# Patient Record
Sex: FEMALE | Race: BLACK OR AFRICAN AMERICAN | Employment: FULL TIME | URBAN - METROPOLITAN AREA
[De-identification: names, ages, dates, MRNs, and addresses within clinical notes are randomized per-mention and may not be internally consistent; named-entity substitution may affect disease eponyms.]

---

## 2021-09-09 ENCOUNTER — APPOINTMENT (OUTPATIENT)
Dept: GENERAL RADIOLOGY | Facility: HOSPITAL | Age: 41
End: 2021-09-09
Attending: EMERGENCY MEDICINE
Payer: COMMERCIAL

## 2021-09-09 ENCOUNTER — HOSPITAL ENCOUNTER (EMERGENCY)
Facility: HOSPITAL | Age: 41
Discharge: HOME | End: 2021-09-09
Attending: EMERGENCY MEDICINE
Payer: COMMERCIAL

## 2021-09-09 VITALS
RESPIRATION RATE: 22 BRPM | SYSTOLIC BLOOD PRESSURE: 149 MMHG | WEIGHT: 275 LBS | BODY MASS INDEX: 40.73 KG/M2 | TEMPERATURE: 99 F | HEART RATE: 103 BPM | OXYGEN SATURATION: 96 % | HEIGHT: 69 IN | DIASTOLIC BLOOD PRESSURE: 88 MMHG

## 2021-09-09 DIAGNOSIS — U07.1 PNEUMONIA DUE TO COVID-19 VIRUS: Primary | ICD-10-CM

## 2021-09-09 DIAGNOSIS — J12.82 PNEUMONIA DUE TO COVID-19 VIRUS: Primary | ICD-10-CM

## 2021-09-09 LAB
ALBUMIN SERPL-MCNC: 3.8 G/DL (ref 3.4–5)
ALBUMIN/GLOB SERPL: 1 {RATIO} (ref 1–2)
ALP LIVER SERPL-CCNC: 87 U/L
ALT SERPL-CCNC: 47 U/L
ANION GAP SERPL CALC-SCNC: 5 MMOL/L (ref 0–18)
AST SERPL-CCNC: 46 U/L (ref 15–37)
ATRIAL RATE: 94 BPM
B-HCG UR QL: NEGATIVE
BASOPHILS # BLD AUTO: 0.01 X10(3) UL (ref 0–0.2)
BASOPHILS NFR BLD AUTO: 0.2 %
BILIRUB SERPL-MCNC: 0.4 MG/DL (ref 0.1–2)
BILIRUB UR QL STRIP.AUTO: NEGATIVE
BUN BLD-MCNC: 11 MG/DL (ref 7–18)
CALCIUM BLD-MCNC: 8.6 MG/DL (ref 8.5–10.1)
CHLORIDE SERPL-SCNC: 104 MMOL/L (ref 98–112)
CK SERPL-CCNC: 106 U/L
CLARITY UR REFRACT.AUTO: CLEAR
CO2 SERPL-SCNC: 27 MMOL/L (ref 21–32)
COLOR UR AUTO: YELLOW
CREAT BLD-MCNC: 0.97 MG/DL
CRP SERPL-MCNC: 5.78 MG/DL (ref ?–0.3)
D-DIMER: <0.27 UG/ML FEU (ref ?–0.5)
DEPRECATED HBV CORE AB SER IA-ACNC: 594.9 NG/ML
EOSINOPHIL # BLD AUTO: 0 X10(3) UL (ref 0–0.7)
EOSINOPHIL NFR BLD AUTO: 0 %
ERYTHROCYTE [DISTWIDTH] IN BLOOD BY AUTOMATED COUNT: 12.5 %
GLOBULIN PLAS-MCNC: 3.9 G/DL (ref 2.8–4.4)
GLUCOSE BLD-MCNC: 113 MG/DL (ref 70–99)
GLUCOSE UR STRIP.AUTO-MCNC: NEGATIVE MG/DL
HCT VFR BLD AUTO: 49.2 %
HGB BLD-MCNC: 16.5 G/DL
IMM GRANULOCYTES # BLD AUTO: 0.02 X10(3) UL (ref 0–1)
IMM GRANULOCYTES NFR BLD: 0.4 %
LDH SERPL L TO P-CCNC: 365 U/L
LYMPHOCYTES # BLD AUTO: 1.25 X10(3) UL (ref 1–4)
LYMPHOCYTES NFR BLD AUTO: 23.4 %
M PROTEIN MFR SERPL ELPH: 7.7 G/DL (ref 6.4–8.2)
MCH RBC QN AUTO: 30.6 PG (ref 26–34)
MCHC RBC AUTO-ENTMCNC: 33.5 G/DL (ref 31–37)
MCV RBC AUTO: 91.1 FL
MONOCYTES # BLD AUTO: 0.29 X10(3) UL (ref 0.1–1)
MONOCYTES NFR BLD AUTO: 5.4 %
NEUTROPHILS # BLD AUTO: 3.78 X10 (3) UL (ref 1.5–7.7)
NEUTROPHILS # BLD AUTO: 3.78 X10(3) UL (ref 1.5–7.7)
NEUTROPHILS NFR BLD AUTO: 70.6 %
NITRITE UR QL STRIP.AUTO: NEGATIVE
NT-PROBNP SERPL-MCNC: 6 PG/ML (ref ?–125)
OSMOLALITY SERPL CALC.SUM OF ELEC: 282 MOSM/KG (ref 275–295)
P AXIS: 20 DEGREES
P-R INTERVAL: 160 MS
PH UR STRIP.AUTO: 5 [PH] (ref 5–8)
PLATELET # BLD AUTO: 275 10(3)UL (ref 150–450)
POTASSIUM SERPL-SCNC: 3.8 MMOL/L (ref 3.5–5.1)
PROCALCITONIN SERPL-MCNC: <0.05 NG/ML (ref ?–0.16)
PROT UR STRIP.AUTO-MCNC: NEGATIVE MG/DL
Q-T INTERVAL: 354 MS
QRS DURATION: 92 MS
QTC CALCULATION (BEZET): 442 MS
R AXIS: -20 DEGREES
RBC # BLD AUTO: 5.4 X10(6)UL
RBC UR QL AUTO: NEGATIVE
SODIUM SERPL-SCNC: 136 MMOL/L (ref 136–145)
SP GR UR STRIP.AUTO: 1.02 (ref 1–1.03)
T AXIS: 77 DEGREES
TROPONIN I SERPL-MCNC: <0.045 NG/ML (ref ?–0.04)
UROBILINOGEN UR STRIP.AUTO-MCNC: <2 MG/DL
VENTRICULAR RATE: 94 BPM
WBC # BLD AUTO: 5.4 X10(3) UL (ref 4–11)

## 2021-09-09 PROCEDURE — 71045 X-RAY EXAM CHEST 1 VIEW: CPT | Performed by: EMERGENCY MEDICINE

## 2021-09-09 PROCEDURE — 85025 COMPLETE CBC W/AUTO DIFF WBC: CPT | Performed by: EMERGENCY MEDICINE

## 2021-09-09 PROCEDURE — 81025 URINE PREGNANCY TEST: CPT

## 2021-09-09 PROCEDURE — 85379 FIBRIN DEGRADATION QUANT: CPT | Performed by: EMERGENCY MEDICINE

## 2021-09-09 PROCEDURE — 83615 LACTATE (LD) (LDH) ENZYME: CPT | Performed by: EMERGENCY MEDICINE

## 2021-09-09 PROCEDURE — 84484 ASSAY OF TROPONIN QUANT: CPT | Performed by: EMERGENCY MEDICINE

## 2021-09-09 PROCEDURE — 82550 ASSAY OF CK (CPK): CPT | Performed by: EMERGENCY MEDICINE

## 2021-09-09 PROCEDURE — 83880 ASSAY OF NATRIURETIC PEPTIDE: CPT | Performed by: EMERGENCY MEDICINE

## 2021-09-09 PROCEDURE — 80053 COMPREHEN METABOLIC PANEL: CPT | Performed by: EMERGENCY MEDICINE

## 2021-09-09 PROCEDURE — 99284 EMERGENCY DEPT VISIT MOD MDM: CPT

## 2021-09-09 PROCEDURE — 84145 PROCALCITONIN (PCT): CPT | Performed by: EMERGENCY MEDICINE

## 2021-09-09 PROCEDURE — 82728 ASSAY OF FERRITIN: CPT | Performed by: EMERGENCY MEDICINE

## 2021-09-09 PROCEDURE — 81001 URINALYSIS AUTO W/SCOPE: CPT | Performed by: EMERGENCY MEDICINE

## 2021-09-09 PROCEDURE — 93010 ELECTROCARDIOGRAM REPORT: CPT

## 2021-09-09 PROCEDURE — 96361 HYDRATE IV INFUSION ADD-ON: CPT

## 2021-09-09 PROCEDURE — 86140 C-REACTIVE PROTEIN: CPT | Performed by: EMERGENCY MEDICINE

## 2021-09-09 PROCEDURE — 96360 HYDRATION IV INFUSION INIT: CPT

## 2021-09-09 PROCEDURE — 93005 ELECTROCARDIOGRAM TRACING: CPT

## 2021-09-09 NOTE — ED QUICK NOTES
2nd ACETADOTE on hold per MD order    IV access attempted. Unable to achieve. Ultrasound IV will be attempted.

## 2021-09-09 NOTE — ED PROVIDER NOTES
Patient Seen in: [facility] Emergency Department      History   Patient presents with:  Covid    Stated Complaint: lethargic, cough, body aches. Covid + 9/1/2021.  Dizzy, fevers     HPI/Subjective:   HPI    72-year-old female presents to the emergenc midline. Scalp is atraumatic. NECK: Neck is supple, there is no nuchal rigidity. HEART: Regular rate and rhythm, no murmurs. LUNGS: Clear to auscultation bilaterally. No Rales, no rhonchi, no wheezing, no stridor.   ABDOMEN: Soft, nondistended,non ------                     CBC W/ DIFFERENTIAL[[id number]]          Abnormal            Final result                 Please view results for these tests on the individual orders.    RAINBOW DRAW BLUE   RAINBOW DRAW LAVENDER   RAINBOW DRAW LIGHT GREEN   RAIN surfaces, and frequent handwashing) according to CDC guidelines.                  Disposition and Plan     Clinical Impression:  Pneumonia due to COVID-19 virus  (primary encounter diagnosis)     Disposition:  Discharge  9/9/2021  8:59 pm    Follow-up:  Danii Hall

## 2021-09-09 NOTE — ED INITIAL ASSESSMENT (HPI)
Pt reports to the ER with complaints of SOB and exacerbation of symptoms related to covid diagnosis. Pt diagnosed with covid on 9/1/21.

## 2021-09-13 ENCOUNTER — PATIENT OUTREACH (OUTPATIENT)
Dept: CASE MANAGEMENT | Age: 41
End: 2021-09-13

## 2021-09-13 PROBLEM — J01.90 ACUTE SINUSITIS: Status: ACTIVE | Noted: 2020-02-28

## 2021-09-13 PROBLEM — U07.1 COVID-19: Status: ACTIVE | Noted: 2021-09-01

## 2021-09-13 PROBLEM — Z20.822 EXPOSURE TO SEVERE ACUTE RESPIRATORY SYNDROME CORONAVIRUS 2 (SARS-COV-2): Status: ACTIVE | Noted: 2021-09-01

## 2021-09-13 PROBLEM — Z72.0 TOBACCO USER: Status: ACTIVE | Noted: 2021-09-13

## 2021-09-13 PROBLEM — E66.9 OBESITY: Status: ACTIVE | Noted: 2020-02-28

## 2021-09-13 RX ORDER — PREDNISONE 20 MG/1
TABLET ORAL
COMMUNITY
End: 2021-09-17 | Stop reason: ALTCHOICE

## 2021-09-13 RX ORDER — IPRATROPIUM BROMIDE 21 UG/1
SPRAY, METERED NASAL
COMMUNITY
End: 2021-10-05

## 2021-09-13 RX ORDER — AMOXICILLIN AND CLAVULANATE POTASSIUM 875; 125 MG/1; MG/1
TABLET, FILM COATED ORAL
COMMUNITY
End: 2021-09-17 | Stop reason: ALTCHOICE

## 2021-09-13 RX ORDER — AZITHROMYCIN 250 MG/1
TABLET, FILM COATED ORAL
COMMUNITY
End: 2021-09-17 | Stop reason: ALTCHOICE

## 2021-09-13 RX ORDER — FLUTICASONE PROPIONATE 50 MCG
SPRAY, SUSPENSION (ML) NASAL
COMMUNITY

## 2021-09-13 RX ORDER — METHYLPREDNISOLONE 4 MG/1
TABLET ORAL
COMMUNITY
Start: 2021-09-09 | End: 2021-09-17 | Stop reason: ALTCHOICE

## 2021-09-13 NOTE — PROGRESS NOTES
Patient called requesting assistance scheduling nam Turner  [id number] [facility]  [place] [phone]  Apt made for Fri. Sept. 17th @11am    Patient notified

## 2021-09-17 ENCOUNTER — OFFICE VISIT (OUTPATIENT)
Dept: FAMILY MEDICINE CLINIC | Facility: CLINIC | Age: 41
End: 2021-09-17
Payer: COMMERCIAL

## 2021-09-17 VITALS
SYSTOLIC BLOOD PRESSURE: 116 MMHG | HEART RATE: 90 BPM | OXYGEN SATURATION: 99 % | WEIGHT: 267 LBS | TEMPERATURE: 97 F | BODY MASS INDEX: 39 KG/M2 | RESPIRATION RATE: 16 BRPM | DIASTOLIC BLOOD PRESSURE: 72 MMHG

## 2021-09-17 DIAGNOSIS — J12.82 PNEUMONIA DUE TO COVID-19 VIRUS: Primary | ICD-10-CM

## 2021-09-17 DIAGNOSIS — Z13.0 SCREENING, ANEMIA, DEFICIENCY, IRON: ICD-10-CM

## 2021-09-17 DIAGNOSIS — Z11.4 SCREENING FOR HIV (HUMAN IMMUNODEFICIENCY VIRUS): ICD-10-CM

## 2021-09-17 DIAGNOSIS — U07.1 PNEUMONIA DUE TO COVID-19 VIRUS: Primary | ICD-10-CM

## 2021-09-17 DIAGNOSIS — Z11.59 ENCOUNTER FOR HEPATITIS C SCREENING TEST FOR LOW RISK PATIENT: ICD-10-CM

## 2021-09-17 DIAGNOSIS — E78.2 MIXED HYPERLIPIDEMIA: ICD-10-CM

## 2021-09-17 DIAGNOSIS — J01.00 ACUTE NON-RECURRENT MAXILLARY SINUSITIS: ICD-10-CM

## 2021-09-17 PROBLEM — Z72.0 TOBACCO USER: Status: RESOLVED | Noted: 2021-09-13 | Resolved: 2021-09-17

## 2021-09-17 PROBLEM — M54.50 LUMBAGO: Status: ACTIVE | Noted: 2021-09-17

## 2021-09-17 PROBLEM — E66.01 MORBID OBESITY (HCC): Status: ACTIVE | Noted: 2020-02-28

## 2021-09-17 PROBLEM — N62 HYPERTROPHY OF BREAST: Status: ACTIVE | Noted: 2021-09-17

## 2021-09-17 PROCEDURE — 99203 OFFICE O/P NEW LOW 30 MIN: CPT | Performed by: FAMILY MEDICINE

## 2021-09-17 PROCEDURE — 3074F SYST BP LT 130 MM HG: CPT | Performed by: FAMILY MEDICINE

## 2021-09-17 PROCEDURE — 3078F DIAST BP <80 MM HG: CPT | Performed by: FAMILY MEDICINE

## 2021-09-17 RX ORDER — AMOXICILLIN AND CLAVULANATE POTASSIUM 875; 125 MG/1; MG/1
1 TABLET, FILM COATED ORAL 2 TIMES DAILY
Qty: 20 TABLET | Refills: 0 | Status: SHIPPED | OUTPATIENT
Start: 2021-09-17 | End: 2021-10-05

## 2021-09-17 RX ORDER — OFLOXACIN 3 MG/ML
SOLUTION/ DROPS OPHTHALMIC
COMMUNITY

## 2021-09-17 RX ORDER — TRIFLURIDINE 10 MG/ML
1 SOLUTION OPHTHALMIC
COMMUNITY
Start: 2021-09-14

## 2021-09-17 NOTE — PATIENT INSTRUCTIONS
Take the Augmentin 875 mg one tablet with breakfast and dinner for 10 days. While on the antibiotics, eat yogurt or take a probiotic to help replenish the good bacteria in your intestines. Push fluids and stay well hydrated.     Go to the ER if you have OTC decongestant, unless a similar medicine was prescribed to you. Nasal sprays work the fastest. Use one that contains phenylephrine or oxymetazoline. First blow your nose gently. Then use the spray.  Don't use these medicines more often than directed on t · Seizure  · Trouble breathing  · Feeling dizzy or faint  · Fingernails, skin or lips look blue, purple , or gray  Prevention  Here are steps you can take to help prevent an infection:   · Keep good hand washing habits.   · Don’t have close contact with p

## 2021-09-17 NOTE — PROGRESS NOTES
Marisol Martinez is a 46year old female. HPI:   Patient presents with: Follow - Up: emergency room 9/9      This 44-year-old female presents to the office for follow-up on an ER visit on 9/9/2021. The patient was diagnosed with Covid on 9/1/2021.   The status: Former Smoker      Smokeless tobacco: Never Used    Alcohol use: Yes      Comment: occasionally    Drug use: Never       Medications (Active prior to today's visit):  Current Outpatient Medications   Medication Sig Dispense Refill   • ofloxacin 0.3 edema bilaterally. Skin: Warm and dry. Neuro: Alert and oriented x 3, normal gait.      ASSESSMENT/PLAN:   46year old female with    XR CHEST AP PORTABLE  (CPT=71045)    Result Date: 9/9/2021  PROCEDURE:  XR CHEST AP PORTABLE  (CPT=71045)  TECHNIQUE:  AP 10.1 mg/dL    Calculated Osmolality 282 275 - 295 mOsm/kg    GFR, Non- 74 >=60    GFR, -American 85 >=60    AST 46 (H) 15 - 37 U/L    ALT 47 13 - 56 U/L    Alkaline Phosphatase 87 37 - 98 U/L    Bilirubin, Total 0.4 0.1 - 2.0 mg/dL MCHC 33.5 31.0 - 37.0 g/dL    RDW 12.5 %    Neutrophil Absolute Prelim 3.78 1.50 - 7.70 x10 (3) uL    Neutrophil Absolute 3.78 1.50 - 7.70 x10(3) uL    Lymphocyte Absolute 1.25 1.00 - 4.00 x10(3) uL    Monocyte Absolute 0.29 0.10 - 1.00 x10(3) uL    Eosino worsening symptoms. Paperwork for her employer was completed. Patient may return to work effective 9/22/2021.    2. Acute non-recurrent maxillary sinusitis    Clinically, I believe the patient has developed a sinus infection.   I will treat with Augmentin clavulanate 875-125 MG Oral Tab 20 tablet 0     Sig: Take 1 tablet by mouth 2 (two) times daily. Imaging & Referrals:  None     Patient understands plan and follow-up.   FU in 1 month for annual physical.  To ER for any increased difficulty breathing,

## 2021-10-05 ENCOUNTER — OFFICE VISIT (OUTPATIENT)
Dept: FAMILY MEDICINE CLINIC | Facility: CLINIC | Age: 41
End: 2021-10-05
Payer: COMMERCIAL

## 2021-10-05 ENCOUNTER — LAB ENCOUNTER (OUTPATIENT)
Dept: LAB | Age: 41
End: 2021-10-05
Attending: FAMILY MEDICINE
Payer: COMMERCIAL

## 2021-10-05 VITALS
BODY MASS INDEX: 38.65 KG/M2 | OXYGEN SATURATION: 99 % | RESPIRATION RATE: 16 BRPM | SYSTOLIC BLOOD PRESSURE: 100 MMHG | HEIGHT: 70 IN | TEMPERATURE: 97 F | WEIGHT: 270 LBS | DIASTOLIC BLOOD PRESSURE: 68 MMHG | HEART RATE: 86 BPM

## 2021-10-05 DIAGNOSIS — E78.2 MIXED HYPERLIPIDEMIA: ICD-10-CM

## 2021-10-05 DIAGNOSIS — E66.01 MORBID OBESITY (HCC): ICD-10-CM

## 2021-10-05 DIAGNOSIS — Z11.4 SCREENING FOR HIV (HUMAN IMMUNODEFICIENCY VIRUS): ICD-10-CM

## 2021-10-05 DIAGNOSIS — Z13.0 SCREENING, ANEMIA, DEFICIENCY, IRON: ICD-10-CM

## 2021-10-05 DIAGNOSIS — Z11.59 ENCOUNTER FOR HEPATITIS C SCREENING TEST FOR LOW RISK PATIENT: ICD-10-CM

## 2021-10-05 DIAGNOSIS — R73.09 ELEVATED GLUCOSE: ICD-10-CM

## 2021-10-05 DIAGNOSIS — J12.82 PNEUMONIA DUE TO COVID-19 VIRUS: ICD-10-CM

## 2021-10-05 DIAGNOSIS — Z00.00 WELL ADULT EXAM: Primary | ICD-10-CM

## 2021-10-05 DIAGNOSIS — R04.0 EPISTAXIS: ICD-10-CM

## 2021-10-05 DIAGNOSIS — U07.1 PNEUMONIA DUE TO COVID-19 VIRUS: ICD-10-CM

## 2021-10-05 DIAGNOSIS — R06.83 SNORING: ICD-10-CM

## 2021-10-05 DIAGNOSIS — R53.83 FATIGUE, UNSPECIFIED TYPE: ICD-10-CM

## 2021-10-05 PROCEDURE — 3074F SYST BP LT 130 MM HG: CPT | Performed by: FAMILY MEDICINE

## 2021-10-05 PROCEDURE — 80061 LIPID PANEL: CPT | Performed by: FAMILY MEDICINE

## 2021-10-05 PROCEDURE — 80050 GENERAL HEALTH PANEL: CPT | Performed by: FAMILY MEDICINE

## 2021-10-05 PROCEDURE — 99395 PREV VISIT EST AGE 18-39: CPT | Performed by: FAMILY MEDICINE

## 2021-10-05 PROCEDURE — 86803 HEPATITIS C AB TEST: CPT | Performed by: FAMILY MEDICINE

## 2021-10-05 PROCEDURE — 3008F BODY MASS INDEX DOCD: CPT | Performed by: FAMILY MEDICINE

## 2021-10-05 PROCEDURE — 87389 HIV-1 AG W/HIV-1&-2 AB AG IA: CPT | Performed by: FAMILY MEDICINE

## 2021-10-05 PROCEDURE — 99214 OFFICE O/P EST MOD 30 MIN: CPT | Performed by: FAMILY MEDICINE

## 2021-10-05 PROCEDURE — 3078F DIAST BP <80 MM HG: CPT | Performed by: FAMILY MEDICINE

## 2021-10-05 PROCEDURE — 83036 HEMOGLOBIN GLYCOSYLATED A1C: CPT | Performed by: FAMILY MEDICINE

## 2021-10-05 RX ORDER — ERYTHROMYCIN 5 MG/G
OINTMENT OPHTHALMIC
COMMUNITY
Start: 2021-09-28

## 2021-10-05 NOTE — PROGRESS NOTES
Cody Mendez is a 46year old female who is here for Patient presents with:   Well Adult      HPI:     This 30-year-old female presents to the office for her well adult exam.    I had last seen this patient on 9/17/2021 in follow-up of her ER visit whe simplex right eye with scarring, no glaucoma  Hearing aids: no    Family History for:  Breast ca: paternal GM  Ovarian ca: No  Uterine ca:  No  Colon ca: paternal GF      Last pap smear: 5 years S/P hysterectomy for fibroids and heavy bleeding, no cervi , Rfl: No current facility-administered medications on file prior to visit.       Allergies:    No Known Allergies    REVIEW OF SYSTEMS:     See HPI for relevant ROS  GENERAL HEALTH: no other complaints  NEURO: no other complaints  VISION: no other comp normal bowel sounds in all four quadrants. : deferred-s/p hysterectomy  BACK: No tenderness over the thoracic or lumbar spine. No scoliosis noted,  EXTREMITIES: No clubbing, cyanosis, edema noted. Motor strength +5/5 in all 4 extremities.  DTR's +2/4 in very friable. She is referred to Dr. Cristobal Dominique  of ENT if the epistaxis does not resolve. - ENT - INTERNAL    6. Snoring    The patient admits to mouth breathing at nighttime and snoring loud enough to awaken herself.  She states her partner has told her he

## 2021-10-05 NOTE — PATIENT INSTRUCTIONS
Go for your fasting blood tests. Do not eat or drink except for water for at least 8 hours prior to the blood tests. Use Saline nasal spray to hydrate your nose to help with the nose bleeds. Continue with the Vaseline inside your nose at bedtime.      If ages 24 and 34 should have a Pap test every 3 years; women between ages 27 and 72 are advised to have a Pap test plus an HPV test every 5 years    Chlamydia Sexually active women ages 25 and younger, and women at increased risk for infection  Every 3 years women in this age group up to age 32  3 doses; the second dose should be given 1 to 2 months after the first dose and the third dose given 6 months after the first dose    Influenza (flu) All women in this age group  Once a year   Measles, mumps, rubella ( option for women starting in their 25s.  But the U.S. Preventive Services Task Force (USPSTF) does not recommend CBE.    2 Those who are 25years old and not up-to-date on their childhood vaccines should get all appropriate catch-up vaccines recommended by t

## 2021-10-06 DIAGNOSIS — R73.09 ELEVATED GLUCOSE: Primary | ICD-10-CM

## 2021-10-06 DIAGNOSIS — E78.2 MIXED HYPERLIPIDEMIA: ICD-10-CM

## 2021-10-12 DIAGNOSIS — R73.09 ELEVATED HEMOGLOBIN A1C: Primary | ICD-10-CM

## 2022-05-27 ENCOUNTER — OFFICE VISIT (OUTPATIENT)
Dept: FAMILY MEDICINE CLINIC | Facility: CLINIC | Age: 42
End: 2022-05-27
Payer: COMMERCIAL

## 2022-05-27 VITALS
BODY MASS INDEX: 37.94 KG/M2 | SYSTOLIC BLOOD PRESSURE: 102 MMHG | WEIGHT: 265 LBS | HEIGHT: 70 IN | HEART RATE: 79 BPM | RESPIRATION RATE: 18 BRPM | DIASTOLIC BLOOD PRESSURE: 62 MMHG | OXYGEN SATURATION: 99 % | TEMPERATURE: 98 F

## 2022-05-27 DIAGNOSIS — R53.83 FATIGUE, UNSPECIFIED TYPE: ICD-10-CM

## 2022-05-27 DIAGNOSIS — E78.2 MIXED HYPERLIPIDEMIA: ICD-10-CM

## 2022-05-27 DIAGNOSIS — M79.671 RIGHT FOOT PAIN: ICD-10-CM

## 2022-05-27 DIAGNOSIS — R73.03 PREDIABETES: ICD-10-CM

## 2022-05-27 DIAGNOSIS — E66.01 MORBID OBESITY (HCC): ICD-10-CM

## 2022-05-27 DIAGNOSIS — N95.1 PERIMENOPAUSE: Primary | ICD-10-CM

## 2022-05-27 PROBLEM — Z20.822 EXPOSURE TO SEVERE ACUTE RESPIRATORY SYNDROME CORONAVIRUS 2 (SARS-COV-2): Status: RESOLVED | Noted: 2021-09-01 | Resolved: 2022-05-27

## 2022-05-27 PROBLEM — U07.1 COVID-19: Status: RESOLVED | Noted: 2021-09-01 | Resolved: 2022-05-27

## 2022-05-27 PROCEDURE — 99214 OFFICE O/P EST MOD 30 MIN: CPT | Performed by: FAMILY MEDICINE

## 2022-05-27 PROCEDURE — 3078F DIAST BP <80 MM HG: CPT | Performed by: FAMILY MEDICINE

## 2022-05-27 PROCEDURE — 3008F BODY MASS INDEX DOCD: CPT | Performed by: FAMILY MEDICINE

## 2022-05-27 PROCEDURE — 3074F SYST BP LT 130 MM HG: CPT | Performed by: FAMILY MEDICINE

## 2022-05-27 NOTE — PATIENT INSTRUCTIONS
Go for your fasting blood tests. Do not eat or drink except for water for at least 8 hours prior to the blood tests. Schedule your appointment with the podiatrist, Dr. Martita Daugherty, for further evaluation of your right foot pain. Schedule your appointment with the weight loss clinic. Discuss your symptoms with your gynecologist. He might recommend medication to help.     See me in October for your annual physical.

## 2023-01-18 ENCOUNTER — OFFICE VISIT (OUTPATIENT)
Dept: INTERNAL MEDICINE CLINIC | Facility: CLINIC | Age: 43
End: 2023-01-18
Payer: COMMERCIAL

## 2023-01-18 VITALS
HEIGHT: 69 IN | WEIGHT: 279 LBS | DIASTOLIC BLOOD PRESSURE: 70 MMHG | HEART RATE: 84 BPM | RESPIRATION RATE: 16 BRPM | OXYGEN SATURATION: 97 % | SYSTOLIC BLOOD PRESSURE: 118 MMHG | BODY MASS INDEX: 41.32 KG/M2

## 2023-01-18 DIAGNOSIS — E78.2 MIXED HYPERLIPIDEMIA: ICD-10-CM

## 2023-01-18 DIAGNOSIS — R06.83 SNORING: ICD-10-CM

## 2023-01-18 DIAGNOSIS — Z71.3 ENCOUNTER FOR WEIGHT LOSS COUNSELING: ICD-10-CM

## 2023-01-18 DIAGNOSIS — Z51.81 ENCOUNTER FOR THERAPEUTIC DRUG MONITORING: Primary | ICD-10-CM

## 2023-01-18 DIAGNOSIS — R73.03 PREDIABETES: ICD-10-CM

## 2023-01-18 DIAGNOSIS — E66.01 CLASS 3 SEVERE OBESITY WITH SERIOUS COMORBIDITY AND BODY MASS INDEX (BMI) OF 40.0 TO 44.9 IN ADULT, UNSPECIFIED OBESITY TYPE (HCC): ICD-10-CM

## 2023-01-18 PROCEDURE — 3074F SYST BP LT 130 MM HG: CPT | Performed by: NURSE PRACTITIONER

## 2023-01-18 PROCEDURE — 99204 OFFICE O/P NEW MOD 45 MIN: CPT | Performed by: NURSE PRACTITIONER

## 2023-01-18 PROCEDURE — 3078F DIAST BP <80 MM HG: CPT | Performed by: NURSE PRACTITIONER

## 2023-01-18 PROCEDURE — 3008F BODY MASS INDEX DOCD: CPT | Performed by: NURSE PRACTITIONER

## 2023-01-19 ENCOUNTER — TELEPHONE (OUTPATIENT)
Dept: INTERNAL MEDICINE CLINIC | Facility: CLINIC | Age: 43
End: 2023-01-19

## 2023-01-19 DIAGNOSIS — E66.01 CLASS 3 SEVERE OBESITY WITH SERIOUS COMORBIDITY AND BODY MASS INDEX (BMI) OF 40.0 TO 44.9 IN ADULT, UNSPECIFIED OBESITY TYPE (HCC): ICD-10-CM

## 2023-01-19 DIAGNOSIS — Z51.81 ENCOUNTER FOR THERAPEUTIC DRUG MONITORING: Primary | ICD-10-CM

## 2023-01-19 NOTE — TELEPHONE ENCOUNTER
I called the [employer] plan to initiate PA,  PA rep said Martha Nine is an plan exclusion. Please advise,  My chart sent.

## 2023-01-19 NOTE — TELEPHONE ENCOUNTER
Switch to phentermine 15 mg, si tab daily in AM #30 with 1 refill and Topamax 25 mg, si tab daily for 7 days, then increase to 1 tab twice a day. #60 with 1 refill.

## 2023-01-19 NOTE — PATIENT INSTRUCTIONS
Welcome to the [facility]...your Lifestyle Renovation begins now! Thank you for placing your trust in our health care team, I look forward to working with you along this journey to better health! Next steps:     1. Call our office at [phone] to schedule a personal nutrition consultation with one of our registered dieticians. Bring along your food journal (3 days minimum). See journal options below. 2.  Complete fasting (10-12 hours, water only) labs at THE [facility] lab site prior to next office visit. 3.  Fill your prescribed medication and take as discussed and prescribed: Start Wegovy at . 25 mg weekly with plans to increase dose monthly if tolerated. Send MyChart status report after 3rd dose so I can send the next script to your pharmacy. If cost prohibitive notify office to consider alternatives: recommend generic alternative to Qsymia with phentermine and Topamax. 4.  Complete additional testing as ordered: Home sleep study. Please try to work on the following dietary changes this first month:    1. Drink water with meals and throughout the day, cut down on soda and/or juice if consumed. Consider flavored water options like Bubbly, Spindrift, Hint and Gumaro. 2.  Eat breakfast daily and focus on having protein with each meal, examples include: greek yogurt, cottage cheese, hard boiled egg, whole grain toast with peanut butter. 3.  Reduce refined carbohydrates and sugars which includes items such as sweets, as well as rice, pasta, and bread and make sure to choose whole grain options when having them with just 1 serving per meal about the size of your inner palm. Daily carbohydrate recommendation to start: 125 grams. 4.  Consume non starchy veggies daily working towards making them a good 50% of your daily food intake. Add them to lunch and dinner consistently. 5.  Start a daily probiotic: VSL#3 is recommended, (order on line at www.vsl3. com).  Take 1 capsule daily with water for 30 days, then reduce to 1 every other day (this will reduce the cost). Capsules can be left out for 2 weeks, but then must be refrigerated. Please download mendez My Fitness Mardel Prim! Or Net Diary to monitor daily dietary intake and you will be able to see if you are eating the right amount of calories or too much or too little which would hinder weight loss. Additionally this will help to see your daily carbohydrate and protein intake. When you set the mendez up choose 1-2 lbs/week as a goal.  Keeping a paper food journal is an option as well to remain accountable for your choices- this is the start to mindful eating! A low calorie diet has been consistently shown to support weight loss. Continue or start exercising to help establish a routine. If not already exercising begin with 1 day and progress as able with long-term goal of 30 minutes 5 days a week at a minimum. Meditation daily can help manage and control stress. Chronic stress can make weight loss difficult. Exercising is one way to help with stress, but meditation using the CALM Mendez or another comparable alternative can be done in your home or place of work with little time commitment. This Mendez can also help work on behavior change and improve sleep. Check out the segment under Calm Masterclass and listen to The 4 Pillars of Health. A great way to begin learning about the foundation of lifestyle with practical tips to use in your every day. Check out www.yourweightmatters. org blog for continued daily support and education along this weight loss journey! Patient Resources:    Personal Training/Fitness Classes/Health Coaching    [facility] KLEBERG and [place] @ [email or web address]/ Full fitness center with group fitness and personal training. Discount available as client of [facility].   Health Coaching and Personal Training with Bryon Campos at our [facility]- individual weekly coaching with option to add personal training and small group fitness classes targeted at weight loss- [phone] and/or email @ [email or web address]  360FIT Cody @ [email or web address] Group Fitness [phone] and/or email Kranthi Uday at [email or web address]  [address] [facility] with multiple locations: [employer] (www.RushFiles), StarBlock.com The Vigoda (www.TMJ Health), Fit Body Bootcamp (www.WorkThinkbodyboLolayp.Visualnet), Trellis Technology (www.Catbird), The Exercise  (www.exercisecoachMixwit)    Online Fitness  Fitness  on [employer] in 10 DVD series   www. uuxdt25TUK. Visualnet  Sit and Be Fit - Chair exercise series Www.sitandbefit. zumatek  Hip Hop Fit with Chiki Garsia at www.hiphopfit. net    Apps for on the [employer] Dynamics 7 Minute Workout (orange box with white 7) - free on the go HIIT training mendez  Peloton Mendez @ wwwSource MDx    Nutrition Trackers and Tools  LoseIT! And My Fitness Pal apps and on line for tracking nutrition  NOOM - virtual health coaching  FitFoundation (healthy meals on the go) in [facility] @ www. xesziekvodpwe4u. Versa José ZUÑIGA @ www.Lasso LogicomGravity.Visualnet and Luz Nuñez (keto and low carb plans recommended) @ www. [email or web address], Metabolic Meals @ www. MyMetabolicMeals. com - individual prepared meals to go  Xceedium, Tak[employer] Labs, [employer], Every Plate, AppIt Ventures- on line meal delivery programs for preparation at home  [place] CheckBonus in [place] for homemade meals to go @ www.mealvillage. com  Diet Doctor @ www. dietdoctor. com - low carb swaps  YummDekko - meal prep and planning mendez (www.yummly. com)    Stress Management/Behavior/Mindful Eating  CALM meditation mendez (www.calmMerrimack Pharmaceuticals)  Headspace  Am I Hungry? Mindful eating virtual  mendez  Www.yourweightmatters. org - Obesity Action Coalition sponsored Blog posts daily  Motivation mendez (black box with white \")- daily supportive messages sent to your phone    Books/Video Education/Podcasts  Mindless Eating by Joan Garcia  Why We Get Sick by Mable Salazar (a book about insulin resistance)  Atomic Habits by Kyung Giles (a book about taking small steps to promote greater behavior change)   Can't Hurt Me by Suzy Luo (a book exploring the power of discipline in achieving your goals)  The End of Dieting: How to Live for Life by Dr. Lenka Blanco M.D. or listen to The [facility] Episode 61: Understanding \"Nutritarian\" Eating w/Dr. Lenka Blanco  Your Body in Balance: The Minds + Machines [employer] of Food, Hormones, and Health by Dr. Inocente Woodward  The Menopause Diet Plan by [facility] AT [facility]  The Complete Guide to fasting by Dr. Jair Mayo, [address] [facility] by Yaniv Leyva, Ph.D, R.D. Weight Loss Surgery Will Not Treat Food Addiction by Vicky Freedman Ph.D  The [address] [place] [address] on plant based nutrition  Fed Up - documentary about obesity (Free on [facility])  The Truth About Sugar - documentary on sugar (Free on RegistryLove, https://youtu. be/1U7svriHL6j)  The Dr. Mariana Paul by Dr. Maryann Hoffman MD  Fitlosophy Fitspiration - journal to better health (found at [employer] in fitness aisle)  What Happened to You?- a look at the impact trauma has on behavior written by Jessica Parker and Dr. Flores Staff Again by Claire Partida - discovering your true self after trauma  Darnell Mares talk on Netflix, The Call to Courage  Podcasts:  The Exam Room by the Physician's Committee, Nutrition Facts by Dr. Saundra Gauthier

## 2023-01-20 RX ORDER — TOPIRAMATE 25 MG/1
TABLET ORAL
Qty: 60 TABLET | Refills: 1 | Status: SHIPPED | OUTPATIENT
Start: 2023-01-20

## 2023-01-20 RX ORDER — PHENTERMINE HYDROCHLORIDE 15 MG/1
15 CAPSULE ORAL EVERY MORNING
Qty: 30 CAPSULE | Refills: 1 | Status: SHIPPED | OUTPATIENT
Start: 2023-01-20

## 2023-04-04 ENCOUNTER — TELEMEDICINE (OUTPATIENT)
Dept: INTERNAL MEDICINE CLINIC | Facility: CLINIC | Age: 43
End: 2023-04-04
Payer: COMMERCIAL

## 2023-04-04 DIAGNOSIS — R73.03 PREDIABETES: ICD-10-CM

## 2023-04-04 DIAGNOSIS — E78.2 MIXED HYPERLIPIDEMIA: ICD-10-CM

## 2023-04-04 DIAGNOSIS — Z51.81 ENCOUNTER FOR THERAPEUTIC DRUG MONITORING: Primary | ICD-10-CM

## 2023-04-04 DIAGNOSIS — E66.01 CLASS 3 SEVERE OBESITY WITH SERIOUS COMORBIDITY AND BODY MASS INDEX (BMI) OF 40.0 TO 44.9 IN ADULT, UNSPECIFIED OBESITY TYPE (HCC): ICD-10-CM

## 2023-04-04 PROCEDURE — 99213 OFFICE O/P EST LOW 20 MIN: CPT | Performed by: NURSE PRACTITIONER

## 2023-04-04 RX ORDER — PHENTERMINE HYDROCHLORIDE 37.5 MG/1
37.5 TABLET ORAL EVERY MORNING
Qty: 30 TABLET | Refills: 2 | Status: SHIPPED | OUTPATIENT
Start: 2023-04-04

## 2023-04-04 RX ORDER — TOPIRAMATE 50 MG/1
50 TABLET, FILM COATED ORAL 2 TIMES DAILY
Qty: 60 TABLET | Refills: 2 | Status: SHIPPED | OUTPATIENT
Start: 2023-04-04

## 2023-08-07 DIAGNOSIS — E66.01 CLASS 3 SEVERE OBESITY WITH SERIOUS COMORBIDITY AND BODY MASS INDEX (BMI) OF 40.0 TO 44.9 IN ADULT, UNSPECIFIED OBESITY TYPE (HCC): ICD-10-CM

## 2023-08-07 DIAGNOSIS — Z51.81 ENCOUNTER FOR THERAPEUTIC DRUG MONITORING: ICD-10-CM

## 2023-08-07 NOTE — TELEPHONE ENCOUNTER
Requesting   Requested Prescriptions     Pending Prescriptions Disp Refills    Phentermine HCl 37.5 MG Oral Tab 30 tablet 2     Sig: Take 1 tablet (37.5 mg total) by mouth every morning. topiramate 50 MG Oral Tab 60 tablet 2     Sig: Take 1 tablet (50 mg total) by mouth 2 (two) times daily.      LOV: 4/4/23  RTC: 2 months  Filled: phen 4/4/23 #30 with 2 refills  Topira 4/4/23 #60 with 2 refills    Future Appointments   Date Time Provider Clotilde Rowei   10/16/2023  2:00 PM EULALIO Whalen EMGWEI [id number]

## 2023-08-10 DIAGNOSIS — Z51.81 ENCOUNTER FOR THERAPEUTIC DRUG MONITORING: ICD-10-CM

## 2023-08-10 DIAGNOSIS — E66.01 CLASS 3 SEVERE OBESITY WITH SERIOUS COMORBIDITY AND BODY MASS INDEX (BMI) OF 40.0 TO 44.9 IN ADULT, UNSPECIFIED OBESITY TYPE (HCC): ICD-10-CM

## 2023-08-11 RX ORDER — PHENTERMINE HYDROCHLORIDE 37.5 MG/1
37.5 TABLET ORAL EVERY MORNING
Qty: 30 TABLET | Refills: 2 | Status: SHIPPED | OUTPATIENT
Start: 2023-08-11

## 2023-08-11 RX ORDER — TOPIRAMATE 50 MG/1
50 TABLET, FILM COATED ORAL 2 TIMES DAILY
Qty: 60 TABLET | Refills: 2 | Status: SHIPPED | OUTPATIENT
Start: 2023-08-11

## 2023-08-16 RX ORDER — PHENTERMINE HYDROCHLORIDE 37.5 MG/1
37.5 TABLET ORAL EVERY MORNING
Qty: 30 TABLET | Refills: 0 | OUTPATIENT
Start: 2023-08-16

## 2023-08-16 RX ORDER — TOPIRAMATE 50 MG/1
50 TABLET, FILM COATED ORAL 2 TIMES DAILY
Qty: 60 TABLET | Refills: 2 | OUTPATIENT
Start: 2023-08-16

## 2023-12-08 PROBLEM — K21.9 GASTROESOPHAGEAL REFLUX DISEASE WITHOUT ESOPHAGITIS: Status: ACTIVE | Noted: 2022-10-06

## 2023-12-08 PROBLEM — N92.6 IRREGULAR PERIODS: Status: ACTIVE | Noted: 2022-09-06

## 2023-12-08 PROBLEM — F32.A DEPRESSIVE DISORDER: Status: ACTIVE | Noted: 2022-09-06

## 2023-12-09 ENCOUNTER — OFFICE VISIT (OUTPATIENT)
Dept: FAMILY MEDICINE CLINIC | Facility: CLINIC | Age: 43
End: 2023-12-09
Payer: COMMERCIAL

## 2023-12-09 VITALS
TEMPERATURE: 98 F | SYSTOLIC BLOOD PRESSURE: 128 MMHG | BODY MASS INDEX: 37.92 KG/M2 | DIASTOLIC BLOOD PRESSURE: 72 MMHG | OXYGEN SATURATION: 99 % | RESPIRATION RATE: 18 BRPM | HEART RATE: 76 BPM | WEIGHT: 256 LBS | HEIGHT: 69 IN

## 2023-12-09 DIAGNOSIS — Z23 NEED FOR TDAP VACCINATION: ICD-10-CM

## 2023-12-09 DIAGNOSIS — Z00.00 LABORATORY EXAM ORDERED AS PART OF ROUTINE GENERAL MEDICAL EXAMINATION: ICD-10-CM

## 2023-12-09 DIAGNOSIS — Z80.0 FAMILY HISTORY OF COLON CANCER: ICD-10-CM

## 2023-12-09 DIAGNOSIS — F32.A DEPRESSIVE DISORDER: ICD-10-CM

## 2023-12-09 DIAGNOSIS — Z00.00 WELLNESS EXAMINATION: Primary | ICD-10-CM

## 2023-12-09 DIAGNOSIS — J06.9 VIRAL UPPER RESPIRATORY TRACT INFECTION: ICD-10-CM

## 2023-12-09 DIAGNOSIS — E66.01 MORBID OBESITY (HCC): ICD-10-CM

## 2023-12-09 DIAGNOSIS — K62.5 RECTAL BLEEDING: ICD-10-CM

## 2023-12-09 DIAGNOSIS — Z28.21 REFUSED INFLUENZA VACCINE: ICD-10-CM

## 2023-12-09 DIAGNOSIS — R73.03 PREDIABETES: ICD-10-CM

## 2023-12-09 DIAGNOSIS — Z13.21 ENCOUNTER FOR VITAMIN DEFICIENCY SCREENING: ICD-10-CM

## 2023-12-09 DIAGNOSIS — E78.2 MIXED HYPERLIPIDEMIA: ICD-10-CM

## 2023-12-09 LAB
ALBUMIN SERPL-MCNC: 3.9 G/DL (ref 3.4–5)
ALBUMIN/GLOB SERPL: 1.1 {RATIO} (ref 1–2)
ALP LIVER SERPL-CCNC: 92 U/L
ALT SERPL-CCNC: 25 U/L
ANION GAP SERPL CALC-SCNC: 8 MMOL/L (ref 0–18)
AST SERPL-CCNC: 21 U/L (ref 15–37)
BASOPHILS # BLD AUTO: 0.04 X10(3) UL (ref 0–0.2)
BASOPHILS NFR BLD AUTO: 0.8 %
BILIRUB SERPL-MCNC: 0.3 MG/DL (ref 0.1–2)
BUN BLD-MCNC: 12 MG/DL (ref 9–23)
CALCIUM BLD-MCNC: 9.2 MG/DL (ref 8.5–10.1)
CHLORIDE SERPL-SCNC: 111 MMOL/L (ref 98–112)
CHOLEST SERPL-MCNC: 237 MG/DL (ref ?–200)
CO2 SERPL-SCNC: 19 MMOL/L (ref 21–32)
CREAT BLD-MCNC: 0.97 MG/DL
EGFRCR SERPLBLD CKD-EPI 2021: 75 ML/MIN/1.73M2 (ref 60–?)
EOSINOPHIL # BLD AUTO: 0.23 X10(3) UL (ref 0–0.7)
EOSINOPHIL NFR BLD AUTO: 4.6 %
ERYTHROCYTE [DISTWIDTH] IN BLOOD BY AUTOMATED COUNT: 11.9 %
EST. AVERAGE GLUCOSE BLD GHB EST-MCNC: 117 MG/DL (ref 68–126)
FASTING PATIENT LIPID ANSWER: YES
FASTING STATUS PATIENT QL REPORTED: YES
GLOBULIN PLAS-MCNC: 3.5 G/DL (ref 2.8–4.4)
GLUCOSE BLD-MCNC: 100 MG/DL (ref 70–99)
HBA1C MFR BLD: 5.7 % (ref ?–5.7)
HCT VFR BLD AUTO: 43.9 %
HDLC SERPL-MCNC: 45 MG/DL (ref 40–59)
HGB BLD-MCNC: 15 G/DL
IMM GRANULOCYTES # BLD AUTO: 0.01 X10(3) UL (ref 0–1)
IMM GRANULOCYTES NFR BLD: 0.2 %
LDLC SERPL CALC-MCNC: 165 MG/DL (ref ?–100)
LYMPHOCYTES # BLD AUTO: 1.34 X10(3) UL (ref 1–4)
LYMPHOCYTES NFR BLD AUTO: 26.7 %
MCH RBC QN AUTO: 30.7 PG (ref 26–34)
MCHC RBC AUTO-ENTMCNC: 34.2 G/DL (ref 31–37)
MCV RBC AUTO: 90 FL
MONOCYTES # BLD AUTO: 0.54 X10(3) UL (ref 0.1–1)
MONOCYTES NFR BLD AUTO: 10.8 %
NEUTROPHILS # BLD AUTO: 2.86 X10 (3) UL (ref 1.5–7.7)
NEUTROPHILS # BLD AUTO: 2.86 X10(3) UL (ref 1.5–7.7)
NEUTROPHILS NFR BLD AUTO: 56.9 %
NONHDLC SERPL-MCNC: 192 MG/DL (ref ?–130)
OSMOLALITY SERPL CALC.SUM OF ELEC: 286 MOSM/KG (ref 275–295)
PLATELET # BLD AUTO: 389 10(3)UL (ref 150–450)
POTASSIUM SERPL-SCNC: 4 MMOL/L (ref 3.5–5.1)
PROT SERPL-MCNC: 7.4 G/DL (ref 6.4–8.2)
RBC # BLD AUTO: 4.88 X10(6)UL
SODIUM SERPL-SCNC: 138 MMOL/L (ref 136–145)
TRIGL SERPL-MCNC: 150 MG/DL (ref 30–149)
TSI SER-ACNC: 1.82 MIU/ML (ref 0.36–3.74)
VIT B12 SERPL-MCNC: 1903 PG/ML (ref 193–986)
VIT D+METAB SERPL-MCNC: 6.8 NG/ML (ref 30–100)
VLDLC SERPL CALC-MCNC: 30 MG/DL (ref 0–30)
WBC # BLD AUTO: 5 X10(3) UL (ref 4–11)

## 2023-12-09 PROCEDURE — 83036 HEMOGLOBIN GLYCOSYLATED A1C: CPT | Performed by: FAMILY MEDICINE

## 2023-12-09 PROCEDURE — 82607 VITAMIN B-12: CPT | Performed by: FAMILY MEDICINE

## 2023-12-09 PROCEDURE — 84443 ASSAY THYROID STIM HORMONE: CPT | Performed by: FAMILY MEDICINE

## 2023-12-09 PROCEDURE — 85025 COMPLETE CBC W/AUTO DIFF WBC: CPT | Performed by: FAMILY MEDICINE

## 2023-12-09 PROCEDURE — 82306 VITAMIN D 25 HYDROXY: CPT | Performed by: FAMILY MEDICINE

## 2023-12-09 PROCEDURE — 80061 LIPID PANEL: CPT | Performed by: FAMILY MEDICINE

## 2023-12-09 PROCEDURE — 80053 COMPREHEN METABOLIC PANEL: CPT | Performed by: FAMILY MEDICINE

## 2023-12-09 NOTE — PATIENT INSTRUCTIONS
Schedule your appointment with the gastroenterologist, Dr. Juli Lobo or one of her partners, for your colonoscopy for further evaluation of your rectal bleeding. Schedule your mammogram as ordered by your gynecologist.    Mele Mueller for sinus congestion. Limit use of Sudafed. Push fluids. Humidified air. Continue to monitor your blood pressure at home. If consistently over 140/90, make an other appointment with me. Your blood pressure today is normal.    Recommendations for exercise are 3-5 times weekly for 30-60 minutes for a minimum of 150-300 minutes. For premenopausal women and men, 1000 mg of calcium daily is recommended. For postmenopausal women, 1200 mg of calcium daily is recommended. To help the body absorb and use calcium, vitamin D 2000 international units daily is recommended. You received the Tdap (tetanus, diphtheria, pertussis-whooping cough) vaccines today. You may run a low grade fever, have mild redness or swelling at the site of the shot, muscle pain at the site of the shot for the next 2-3 days. You may take Tylenol or Ibuprofen as needed. Use your arm to help decrease pain and swelling. You can apply ice to any swelling for 10-15 minutes twice daily through clothing or a towel. Let me know if your depression symptoms worsen despite your leave of absence. Continue to see your therapist.    I will contact you with your test results once available.

## 2023-12-11 DIAGNOSIS — E78.2 MIXED HYPERLIPIDEMIA: ICD-10-CM

## 2023-12-11 DIAGNOSIS — E55.9 VITAMIN D DEFICIENCY: Primary | ICD-10-CM

## 2023-12-11 DIAGNOSIS — R73.03 PREDIABETES: ICD-10-CM

## 2023-12-11 RX ORDER — ERGOCALCIFEROL 1.25 MG/1
50000 CAPSULE ORAL WEEKLY
Qty: 12 CAPSULE | Refills: 0 | Status: SHIPPED | OUTPATIENT
Start: 2023-12-11 | End: 2024-03-04

## 2024-01-03 ENCOUNTER — OFFICE VISIT (OUTPATIENT)
Dept: FAMILY MEDICINE CLINIC | Facility: CLINIC | Age: 44
End: 2024-01-03
Payer: COMMERCIAL

## 2024-01-03 VITALS
RESPIRATION RATE: 18 BRPM | BODY MASS INDEX: 38.51 KG/M2 | WEIGHT: 260 LBS | SYSTOLIC BLOOD PRESSURE: 110 MMHG | HEIGHT: 69 IN | DIASTOLIC BLOOD PRESSURE: 76 MMHG | TEMPERATURE: 98 F | OXYGEN SATURATION: 99 % | HEART RATE: 76 BPM

## 2024-01-03 DIAGNOSIS — F32.A DEPRESSIVE DISORDER: Primary | ICD-10-CM

## 2024-01-03 PROCEDURE — 3074F SYST BP LT 130 MM HG: CPT | Performed by: FAMILY MEDICINE

## 2024-01-03 PROCEDURE — 99214 OFFICE O/P EST MOD 30 MIN: CPT | Performed by: FAMILY MEDICINE

## 2024-01-03 PROCEDURE — 3008F BODY MASS INDEX DOCD: CPT | Performed by: FAMILY MEDICINE

## 2024-01-03 PROCEDURE — 3078F DIAST BP <80 MM HG: CPT | Performed by: FAMILY MEDICINE

## 2024-01-03 NOTE — PATIENT INSTRUCTIONS
Take the Sertraline 50 mg one half tablet for one week (25 mg) and then increase to one tablet daily. Do not stop this medication abruptly as it will cause a bad headache. Contact the office if you have any concerns about the medication.    Continue with your therapist weekly.    Make a video visit with me in 4 weeks for recheck on depression.

## 2024-01-03 NOTE — PROGRESS NOTES
The 21st Century Cures Act makes medical notes like these available to patients in the interest of transparency. Please be advised this is a medical document. Medical documents are intended to carry relevant information, facts as evident, and the clinical opinion of the practitioner. The medical note is intended as peer to peer communication and may appear blunt or direct. It is written in medical language and may contain abbreviations or verbiage that are unfamiliar.       Eliezer Greco is a 43 year old female.    HPI:     Chief Complaint   Patient presents with    Depression       This 43-year-old female with history for depression presents to the office with complaint of worsening depression symptoms.  When I last seen her in December 2023, the patient stated she was seeing her therapist weekly and she did not feel she needed any antidepressant medications.  Since that visit, the patient has admitted to increased irritability and shifting of moods.  She states she is now having more bad days than good days.  She is either sleeping too much or not getting enough sleep.  She denies any SI or HI.  She states that despite seeing her therapist weekly, her symptoms seem to be worsening.  At this time, she is ready to consider starting medication for her depression.  She has never been on medication for depression previously.    HISTORY:  Past Medical History:   Diagnosis Date    Allergic rhinitis     Anxiety     Depression     Obesity     Pneumonia due to COVID-19 virus     Tobacco user 09/13/2021    Quit 8/2021    Vitamin D deficiency 12/11/2023      Past Surgical History:   Procedure Laterality Date    ADENOIDECTOMY  2013    HYSTERECTOMY  12/23/2015    Partial hysterectomy-still has ovaries    TONSILLECTOMY  2013      Family History   Problem Relation Age of Onset    Anemia Mother     Hypertension Father     High Cholesterol Father     Dementia Maternal Grandmother     Stroke Maternal Grandmother     Breast  Cancer Paternal Grandmother     High Cholesterol Paternal Grandmother     Hypertension Paternal Grandmother     Cancer Paternal Grandmother     Hypertension Paternal Grandfather     High Cholesterol Paternal Grandfather     Glaucoma Paternal Grandfather     Cancer Paternal Grandfather     Stroke Paternal Grandfather     Asthma Sister       Social History:   Social History     Socioeconomic History    Marital status: Single   Tobacco Use    Smoking status: Former     Packs/day: 0.00     Years: 0.00     Additional pack years: 0.00     Total pack years: 0.00     Types: Cigarettes     Quit date: 2021     Years since quittin.3    Smokeless tobacco: Never   Substance and Sexual Activity    Alcohol use: Yes     Comment: occasionally    Drug use: Never   Other Topics Concern    Caffeine Concern No    Stress Concern Yes    Weight Concern Yes    Special Diet No    Exercise Yes    Seat Belt Yes        Medications (Active prior to today's visit):  Current Outpatient Medications   Medication Sig Dispense Refill    sertraline (ZOLOFT) 50 MG Oral Tab Take one half tablet (25 mg) once daily for one week and then increase to one tablet (50 mg) daily. 30 tablet 0    ergocalciferol 1.25 MG (47741 UT) Oral Cap Take 1 capsule (50,000 Units total) by mouth once a week. 12 capsule 0    Phentermine HCl 37.5 MG Oral Tab Take 1 tablet (37.5 mg total) by mouth every morning. (Patient not taking: Reported on 2023) 30 tablet 2    topiramate 50 MG Oral Tab Take 1 tablet (50 mg total) by mouth 2 (two) times daily. (Patient not taking: Reported on 1/3/2024) 60 tablet 2       Allergies:  No Known Allergies    ROS:     Pertinent positives and pertinent negatives are as listed in HPI.    All other review of symptoms were reviewed and negative.    PHYSICAL EXAM:   /76 (BP Location: Left arm, Patient Position: Sitting, Cuff Size: large)   Pulse 76   Temp 98 °F (36.7 °C)   Resp 18   Ht 5' 9\" (1.753 m)   Wt 260 lb (117.9 kg)    SpO2 99%   BMI 38.40 kg/m²     Wt Readings from Last 3 Encounters:   01/03/24 260 lb (117.9 kg)   12/09/23 256 lb (116.1 kg)   01/18/23 279 lb (126.6 kg)       BP Readings from Last 3 Encounters:   01/03/24 110/76   12/09/23 128/72   01/18/23 118/70       General: Obese, well hydrated. No acute distress. No pallor.   HEENT: Normocephalic, atraumatic.  NAVIN, EOMI, Sclera clear and non icteric bilaterally. TM's normal, nose without congestion, pharynx without redness or exudates. Moist mucous membranes.  Neck: Supple. No lymphadenopathy. No thyromegaly. No bruits noted.  Heart: RRR without S3 or S4 or murmur.  Lungs: Clear to auscultation bilaterally. No rales, rhonchi or wheezes. No tachypnea or retractions noted.  Extremities: No edema bilaterally.  Skin: Warm and dry.  Neuro: Alert and oriented x 3, normal gait.  Psych: Mood depressed and affect is flat. Clear thoughts.     ASSESSMENT/PLAN:   43 year old female with    LABS This Visit:    Results for orders placed or performed in visit on 12/09/23   Comp Metabolic Panel (14)    Collection Time: 12/09/23  9:01 AM   Result Value Ref Range    Glucose 100 (H) 70 - 99 mg/dL    Sodium 138 136 - 145 mmol/L    Potassium 4.0 3.5 - 5.1 mmol/L    Chloride 111 98 - 112 mmol/L    CO2 19.0 (L) 21.0 - 32.0 mmol/L    Anion Gap 8 0 - 18 mmol/L    BUN 12 9 - 23 mg/dL    Creatinine 0.97 0.55 - 1.02 mg/dL    Calcium, Total 9.2 8.5 - 10.1 mg/dL    Calculated Osmolality 286 275 - 295 mOsm/kg    eGFR-Cr 75 >=60 mL/min/1.73m2    AST 21 15 - 37 U/L    ALT 25 13 - 56 U/L    Alkaline Phosphatase 92 37 - 98 U/L    Bilirubin, Total 0.3 0.1 - 2.0 mg/dL    Total Protein 7.4 6.4 - 8.2 g/dL    Albumin 3.9 3.4 - 5.0 g/dL    Globulin  3.5 2.8 - 4.4 g/dL    A/G Ratio 1.1 1.0 - 2.0    Patient Fasting for CMP? Yes    Lipid Panel    Collection Time: 12/09/23  9:01 AM   Result Value Ref Range    Cholesterol, Total 237 (H) <200 mg/dL    HDL Cholesterol 45 40 - 59 mg/dL    Triglycerides 150 (H) 30 - 149  mg/dL    LDL Cholesterol 165 (H) <100 mg/dL    VLDL 30 0 - 30 mg/dL    Non HDL Chol 192 (H) <130 mg/dL    Patient Fasting for Lipid? Yes    TSH W Reflex To Free T4    Collection Time: 12/09/23  9:01 AM   Result Value Ref Range    TSH 1.820 0.358 - 3.740 mIU/mL   Hemoglobin A1C [E]    Collection Time: 12/09/23  9:01 AM   Result Value Ref Range    HgbA1C 5.7 (H) <5.7 %    Estimated Average Glucose 117 68 - 126 mg/dL   Vitamin B12 [E]    Collection Time: 12/09/23  9:01 AM   Result Value Ref Range    Vitamin B12 1,903 (H) 193 - 986 pg/mL   Vitamin D, 25-Hydroxy    Collection Time: 12/09/23  9:01 AM   Result Value Ref Range    Vitamin D, 25OH, Total 6.8 (L) 30.0 - 100.0 ng/mL   CBC W/ DIFFERENTIAL    Collection Time: 12/09/23  9:01 AM   Result Value Ref Range    WBC 5.0 4.0 - 11.0 x10(3) uL    RBC 4.88 3.80 - 5.30 x10(6)uL    HGB 15.0 12.0 - 16.0 g/dL    HCT 43.9 35.0 - 48.0 %    .0 150.0 - 450.0 10(3)uL    MCV 90.0 80.0 - 100.0 fL    MCH 30.7 26.0 - 34.0 pg    MCHC 34.2 31.0 - 37.0 g/dL    RDW 11.9 %    Neutrophil Absolute Prelim 2.86 1.50 - 7.70 x10 (3) uL    Neutrophil Absolute 2.86 1.50 - 7.70 x10(3) uL    Lymphocyte Absolute 1.34 1.00 - 4.00 x10(3) uL    Monocyte Absolute 0.54 0.10 - 1.00 x10(3) uL    Eosinophil Absolute 0.23 0.00 - 0.70 x10(3) uL    Basophil Absolute 0.04 0.00 - 0.20 x10(3) uL    Immature Granulocyte Absolute 0.01 0.00 - 1.00 x10(3) uL    Neutrophil % 56.9 %    Lymphocyte % 26.7 %    Monocyte % 10.8 %    Eosinophil % 4.6 %    Basophil % 0.8 %    Immature Granulocyte % 0.2 %        1. Depressive disorder    1. Little interest or pleasure in doing things: Nearly every day  2. Feeling down, depressed, or hopeless: Nearly every day  3. Trouble falling or staying asleep, or sleeping too much: Nearly every day  4. Feeling tired or having little energy: Nearly every day  5. Poor appetite or overeating: Nearly every day  6. Feeling bad about yourself - or that you are a failure or have let yourself  or your family down: More than half the days  7. Trouble concentrating on things, such as reading the newspaper or watching television: Nearly every day  8. Moving or speaking so slowly that other people could have noticed. Or the opposite - being so fidgety or restless that you have been moving around a lot more than usual: Not at all  9. Thoughts that you would be better off dead, or of hurting yourself in some way: Not at all  PHQ-9 TOTAL SCORE: 20  If you checked off any problems, how difficult have these problems made it for you to do your work, take care of things at home, or get along with other people?: Not difficult at all       Patient's PHQ-9 score has worsened from 4 weeks ago when it was 15.    I discussed the patient's depression screening and her symptoms.  The patient states she will continue to see her therapist on a weekly basis.  After discussion of options of medications, she is agreeable to starting Zoloft.  Usage and side effects are reviewed.  The patient was reminded not to stop this medication abruptly as it would trigger a severe headache.  She was advised to contact the office if she has any concerns about the medication before stopping it.  She will make a video visit with me in 4 weeks for reassessment on the depression after starting the Zoloft.    - sertraline (ZOLOFT) 50 MG Oral Tab; Take one half tablet (25 mg) once daily for one week and then increase to one tablet (50 mg) daily.  Dispense: 30 tablet; Refill: 0         Health Maintenance:    Health Maintenance   Topic Date Due    Mammogram  Never done    Influenza Vaccine (1) 12/09/2024 (Originally 10/1/2023)    COVID-19 Vaccine (1) 12/09/2024 (Originally 10/16/1980)    Annual Physical  12/09/2024    DTaP,Tdap,and Td Vaccines (7 - Td or Tdap) 12/09/2033    Annual Depression Screening  Completed    Pneumococcal Vaccine: Birth to 64yrs  Aged Out         Meds This Visit:  Requested Prescriptions     Signed Prescriptions Disp Refills     sertraline (ZOLOFT) 50 MG Oral Tab 30 tablet 0     Sig: Take one half tablet (25 mg) once daily for one week and then increase to one tablet (50 mg) daily.       Imaging & Referrals:  None     Patient understands plan and follow-up.  FU in 4 weeks video visit for recheck on depression.    1/3/2024  Evette Cloud DO    Total time: 30 minutes including precharting, H&P, plan of care    This dictation was performed with a verbal recognition program (DRAGON) and it was checked for errors. It is possible that there are still dictated errors within this office note. If so, please bring any errors to my attention for an addendum. All efforts were made to ensure that this office note is accurate

## 2024-01-08 ENCOUNTER — OFFICE VISIT (OUTPATIENT)
Dept: INTERNAL MEDICINE CLINIC | Facility: CLINIC | Age: 44
End: 2024-01-08
Payer: COMMERCIAL

## 2024-01-08 VITALS
DIASTOLIC BLOOD PRESSURE: 84 MMHG | BODY MASS INDEX: 38.95 KG/M2 | OXYGEN SATURATION: 98 % | SYSTOLIC BLOOD PRESSURE: 128 MMHG | HEART RATE: 82 BPM | WEIGHT: 263 LBS | HEIGHT: 69 IN | RESPIRATION RATE: 18 BRPM

## 2024-01-08 DIAGNOSIS — E78.2 MIXED HYPERLIPIDEMIA: ICD-10-CM

## 2024-01-08 DIAGNOSIS — Z51.81 ENCOUNTER FOR THERAPEUTIC DRUG MONITORING: Primary | ICD-10-CM

## 2024-01-08 DIAGNOSIS — E66.01 CLASS 3 SEVERE OBESITY WITH SERIOUS COMORBIDITY AND BODY MASS INDEX (BMI) OF 40.0 TO 44.9 IN ADULT, UNSPECIFIED OBESITY TYPE (HCC): ICD-10-CM

## 2024-01-08 DIAGNOSIS — E55.9 VITAMIN D DEFICIENCY: ICD-10-CM

## 2024-01-08 DIAGNOSIS — R73.03 PREDIABETES: ICD-10-CM

## 2024-01-08 PROCEDURE — 3074F SYST BP LT 130 MM HG: CPT | Performed by: NURSE PRACTITIONER

## 2024-01-08 PROCEDURE — 3079F DIAST BP 80-89 MM HG: CPT | Performed by: NURSE PRACTITIONER

## 2024-01-08 PROCEDURE — 99214 OFFICE O/P EST MOD 30 MIN: CPT | Performed by: NURSE PRACTITIONER

## 2024-01-08 PROCEDURE — 3008F BODY MASS INDEX DOCD: CPT | Performed by: NURSE PRACTITIONER

## 2024-01-08 RX ORDER — TOPIRAMATE 100 MG/1
100 TABLET, FILM COATED ORAL 2 TIMES DAILY
Qty: 180 TABLET | Refills: 1 | Status: SHIPPED | OUTPATIENT
Start: 2024-01-08

## 2024-01-08 RX ORDER — PHENTERMINE HYDROCHLORIDE 37.5 MG/1
37.5 TABLET ORAL EVERY MORNING
Qty: 30 TABLET | Refills: 2 | Status: SHIPPED | OUTPATIENT
Start: 2024-01-08

## 2024-01-08 NOTE — PATIENT INSTRUCTIONS
Continue making lifestyle changes that focus on good nutrition, regular exercise and stress management.    Medication Plan: Continue phentermine at current dose and increase Topamax to 100 mg twice a day.    Next steps to work on before next office visit include: Reviewed labs from PCP in EMR.    Take time to eat and establish regular meal times. Recommend a 15 minute sand timer.  Use tools to help reduce stress/anxiety/overwhelm. Recommend the CALM gisell for daily meditation.      Re-thinking Nutrition: Learning to See Food as Fuel  October 8, 2019  Posted in Blog, Nutrition  By Your [employer] Campaign    “Dieting” can start to feel challenging when we begin to associate healthy behaviors with “punishment.” Too often, we overlook the real reason we eat food. It's not only meant to be enjoyed, but also to provide basic fuel for our bodies.  Learning to see food as fuel can help you find balance in your journey with weight. It will teach you about the primal purpose of food, the effect certain foods have on health, and how to listen carefully to what your body is trying to tell you.  It Starts with Cells  Did you know your body has more than 35 trillion cells? Each one serves a purpose.  Once a cell has completed its purpose, it dies. Your body replaces dead and worn-out cells with new and energetic ones. It also depends on this ongoing cell cycle to keep you healthy. And guess what? The foods you eat help shape this process.  Seeing Food as Fuel  Eating the right foods helps build and repair cells to be stronger than before. It does this by getting nutrition from whole grains, vitamins, minerals, fats, protein and other nutrients.  These essential nutrients matter greatly to every single cell in your body. They make up your:  Cell membrane  Nucleus  Mitochondria  When cells join together, they make tissue that brings life to your bones, brain, skin, nerves, muscles, etc. The health and lifespan of your cells  depend on the nutrients you get from food. That is why healthy food choices are so important.   Once you can start to see food as being fuel for your body, you will get better at making the healthy choice the easy choice. Food will be less about rewards or punishments and more about supporting your overall health and happiness.  Building Blocks of Good Nutrition  A diet without enough fiber, protein and healthy fats can cause your cells to become brittle, leaky and tired. When cells can't do what they are designed to do, problems like inflammation, cancer and other difficult health conditions start to arise.  Foods that Support Healthy Cells:  Unsaturated Fats - Fish, nuts avocados, olive oil, flax seed, etc.  Protein - Poultry, lean beef, yogurt, eggs, seeds, beans, etc.  Antioxidants - Fruits, dark green veggies, sweet potatoes, tea, etc.  So, the next time you grab something to eat, ask yourself how that food helps or hurts your body. This is a part of living mindful and being knowledgeable about what you consume regularly.  When you start to see food as fuel, not just a tasty treat or the solution to a bad temper, you will start to make healthier choices more often. Making new habits is one of the building blocks to successful long-term weight management!  Balanced Nutrition includes:     Build the mentality of Food 4 Fuel. Clean eating with whole foods and eliminating/reducing ultra processed foods.  Be an intuitive eater and using mindful eating practices.  Eat a balanced plate with protein and produce at all meals: 1/4 plate- protein, 1/2 plate non starchy veggie, and 1/4 plate fruit or complex carbohydrate.  Drink water with all meals.  Eliminate/reduce late night eating by stopping after 7pm. Allowing your body to fast for 12 hours (drink only water, tea or black coffee without any additives).

## 2024-01-08 NOTE — PROGRESS NOTES
Eliezer Greco is a 43 year old female presents today for follow-up on medical weight loss program for the treatment of overweight, obesity, or morbid obesity with associated prediabetes, hypercholesterolemia.    S:  Current weight   Wt Readings from Last 6 Encounters:   01/08/24 263 lb (119.3 kg)   01/03/24 260 lb (117.9 kg)   12/09/23 256 lb (116.1 kg)   01/18/23 279 lb (126.6 kg)   05/27/22 265 lb (120.2 kg)   10/05/21 270 lb (122.5 kg)    AND BMI Body mass index is 38.84 kg/m²..    Patient has lost -16# since LOV in 4/2023 via VV and in clinic in 1/2023. She is on medication therapy, but feels at a plateau. Currently high stress d/t work and in counseling and on FMLA. Had labs completed by PCP. No lifestyle log completed. Stress eating was prominent.    Testing/consult completed since LOV: Dietician: no.    Labs: prediabetes, Vitamin D deficiency, hypercholesterolemia    Social hx and PMH reviewed. Currently on FMLA. Partner with adult step daughter.    REVIEW OF SYSTEMS:  GENERAL: feels well otherwise  LUNGS: denies shortness of breath with exertion  CARDIOVASCULAR: denies chest pain on exertion, denies palpitations or pedal edema  GI: denies abdominal pain.  No N/V/D/C  MUSCULOSKELETAL: no acute joint or muscle pain  NEURO: denies headaches  PSYCH: denies change in behavior or mood, denies feeling sad or depressed    EXAM:  /84   Pulse 82   Resp 18   Ht 5' 9\" (1.753 m)   Wt 263 lb (119.3 kg)   SpO2 98%   BMI 38.84 kg/m²   GENERAL: well developed, well nourished, in no apparent distress, obese  EYES: conjunctiva pink, sclera non icteric, PERRLA  LUNGS: CTA in all fields, breathing non labored  CARDIO: RRR without murmur, normal S1 and S2 without clicks or gallops, no pedal edema.  GI: +BS  NEURO/MS: motor and sensory grossly intact  PSYCH: pleasant, cooperative, normal mood and affect    ASSESSMENT AND PLAN:  Reviewed Initial Weight Data and Goal Weight Loss:       Encounter Diagnoses   Name  Primary?    Encounter for therapeutic drug monitoring Yes    Class 3 severe obesity with serious comorbidity and body mass index (BMI) of 40.0 to 44.9 in adult, unspecified obesity type (HCC)     Mixed hyperlipidemia     Prediabetes     Vitamin D deficiency        No orders of the defined types were placed in this encounter.      Meds & Refills for this Visit:  Requested Prescriptions     Signed Prescriptions Disp Refills    Phentermine HCl 37.5 MG Oral Tab 30 tablet 2     Sig: Take 1 tablet (37.5 mg total) by mouth every morning.    topiramate 100 MG Oral Tab 180 tablet 1     Sig: Take 1 tablet (100 mg total) by mouth 2 (two) times daily.       Imaging & Consults:  OP REFERRAL TO [facility] ([facility] USE ONLY)      Plan:  Patient has lost -16# since LOV 1 year ago on Topamax 50 mg BID, phentermine 37.5 mg daily with a total weight loss of -16# since initial consult on 1/18/23 with initial weight of 279#.  Labs reviewed by PCP in EMR. Weight loss goal:  lose 30-40# in 6 months, # in 1 year.  CPM, aside from increase Topamax. No AOM coverage.  on food 4 fuel, balanced nutrition, and using meditation for stress management to reduce emotional eating. See patient instructions below for additional plans and patient counseling.      Patient Instructions   Continue making lifestyle changes that focus on good nutrition, regular exercise and stress management.    Medication Plan: Continue phentermine at current dose and increase Topamax to 100 mg twice a day.    Next steps to work on before next office visit include: Reviewed labs from PCP in EMR.    Take time to eat and establish regular meal times. Recommend a 15 minute sand timer.  Use tools to help reduce stress/anxiety/overwhelm. Recommend the CALM gisell for daily meditation.      Re-thinking Nutrition: Learning to See Food as Fuel  October 8, 2019  Posted in Blog, Nutrition  By Your Weight Mat[employer] Campaign    “Dieting” can start to feel challenging when we  begin to associate healthy behaviors with “punishment.” Too often, we overlook the real reason we eat food. It's not only meant to be enjoyed, but also to provide basic fuel for our bodies.  Learning to see food as fuel can help you find balance in your journey with weight. It will teach you about the primal purpose of food, the effect certain foods have on health, and how to listen carefully to what your body is trying to tell you.  It Starts with Cells  Did you know your body has more than 35 trillion cells? Each one serves a purpose.  Once a cell has completed its purpose, it dies. Your body replaces dead and worn-out cells with new and energetic ones. It also depends on this ongoing cell cycle to keep you healthy. And guess what? The foods you eat help shape this process.  Seeing Food as Fuel  Eating the right foods helps build and repair cells to be stronger than before. It does this by getting nutrition from whole grains, vitamins, minerals, fats, protein and other nutrients.  These essential nutrients matter greatly to every single cell in your body. They make up your:  Cell membrane  Nucleus  Mitochondria  When cells join together, they make tissue that brings life to your bones, brain, skin, nerves, muscles, etc. The health and lifespan of your cells depend on the nutrients you get from food. That is why healthy food choices are so important.   Once you can start to see food as being fuel for your body, you will get better at making the healthy choice the easy choice. Food will be less about rewards or punishments and more about supporting your overall health and happiness.  Building Blocks of Good Nutrition  A diet without enough fiber, protein and healthy fats can cause your cells to become brittle, leaky and tired. When cells can't do what they are designed to do, problems like inflammation, cancer and other difficult health conditions start to arise.  Foods that Support Healthy Cells:  Unsaturated Fats  - Fish, nuts avocados, olive oil, flax seed, etc.  Protein - Poultry, lean beef, yogurt, eggs, seeds, beans, etc.  Antioxidants - Fruits, dark green veggies, sweet potatoes, tea, etc.  So, the next time you grab something to eat, ask yourself how that food helps or hurts your body. This is a part of living mindful and being knowledgeable about what you consume regularly.  When you start to see food as fuel, not just a tasty treat or the solution to a bad temper, you will start to make healthier choices more often. Making new habits is one of the building blocks to successful long-term weight management!  Balanced Nutrition includes:     Build the mentality of Food 4 Fuel. Clean eating with whole foods and eliminating/reducing ultra processed foods.  Be an intuitive eater and using mindful eating practices.  Eat a balanced plate with protein and produce at all meals: 1/4 plate- protein, 1/2 plate non starchy veggie, and 1/4 plate fruit or complex carbohydrate.  Drink water with all meals.  Eliminate/reduce late night eating by stopping after 7pm. Allowing your body to fast for 12 hours (drink only water, tea or black coffee without any additives).            Medication use and SEs reviewed with patient.    Return in about 3 months (around 4/8/2024) for weight management via clinic or VV.    Patient verbalizes understanding.    DOCUMENTATION OF TIME SPENT: Code selection for this visit was based on time spent : 30 minutes on date of service in preparing to see the patient, obtaining and/or reviewing separately obtained history, performing a medically appropriate examination, counseling and educating the patient/family/caregiver, ordering medications or testing, referring and communicating with other healthcare providers, documenting clinical information in the electronic medical record, independently interpreting results and communicating results to the patient/family/caregiver and care coordination with the patient's  other providers.

## 2024-09-16 ENCOUNTER — OFFICE VISIT (OUTPATIENT)
Dept: FAMILY MEDICINE CLINIC | Facility: CLINIC | Age: 44
End: 2024-09-16
Payer: COMMERCIAL

## 2024-09-16 VITALS
BODY MASS INDEX: 39.1 KG/M2 | SYSTOLIC BLOOD PRESSURE: 136 MMHG | RESPIRATION RATE: 18 BRPM | WEIGHT: 264 LBS | TEMPERATURE: 98 F | OXYGEN SATURATION: 98 % | DIASTOLIC BLOOD PRESSURE: 72 MMHG | HEART RATE: 71 BPM | HEIGHT: 69 IN

## 2024-09-16 DIAGNOSIS — Z12.31 ENCOUNTER FOR SCREENING MAMMOGRAM FOR MALIGNANT NEOPLASM OF BREAST: ICD-10-CM

## 2024-09-16 DIAGNOSIS — E55.9 VITAMIN D DEFICIENCY: ICD-10-CM

## 2024-09-16 DIAGNOSIS — Z13.29 SCREENING FOR THYROID DISORDER: ICD-10-CM

## 2024-09-16 DIAGNOSIS — E78.2 MIXED HYPERLIPIDEMIA: ICD-10-CM

## 2024-09-16 DIAGNOSIS — R73.03 PREDIABETES: ICD-10-CM
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
## 2024-09-16 DIAGNOSIS — Z13.0 SCREENING, ANEMIA, DEFICIENCY, IRON: ICD-10-CM

## 2024-09-16 RX ORDER — CEPHALEXIN 500 MG/1
500 CAPSULE ORAL 2 TIMES DAILY
Qty: 14 CAPSULE | Refills: 0 | Status: SHIPPED | OUTPATIENT
Start: 2024-09-16

## 2024-09-16 NOTE — PATIENT INSTRUCTIONS
Go for your fasting blood tests. Do not eat or drink except for water for at least 8 hours prior to the blood tests. Do not take any vitamins or Biotin for 3 days before your blood tests.    Schedule your mammogram as ordered.    Take the Cephalexin 500 mg one tablet twice daily with food for one week. While on the antibiotics, eat yogurt or take a probiotic to help replenish the good bacteria in your intestines.    Get your flu shot and the new COVID booster in October at your local pharmacy.    See me in December for your annual physical.

## 2024-09-16 NOTE — PROGRESS NOTES
The 21st Century Cures Act makes medical notes like these available to patients in the interest of transparency. Please be advised this is a medical document. Medical documents are intended to carry relevant information, facts as evident, and the clinical opinion of the practitioner. The medical note is intended as peer to peer communication and may appear blunt or direct. It is written in medical language and may contain abbreviations or verbiage that are unfamiliar.       Eliezer Greco is a 44 year old female.    HPI:     Chief Complaint   Patient presents with    Insect Bite     On back - started 9/14       This 44-year-old female presents to the office with complaint of a painful insect bite which she noticed on 9/14/2024 at the posterior aspect of her left upper back.  She felt she may have had a low-grade fever last night.  She states the pain is radiating up her posterior neck and along the lateral aspect of the neck.  She has been in some wooded areas.  She has not been taking anything for the pain or putting anything on the bite.  She also felt she had a bite on the anterior portion of the left lower neck which was present a week ago.  The rash has not been itchy.  Today, she admits to a sore throat but she is not having any runny nose or cough.  She has not noticed any other bite marks except for the one which is on her back.    HISTORY:  Past Medical History:    Allergic rhinitis    Anxiety    Depression    Obesity    Pneumonia due to COVID-19 virus    Tobacco user    Quit 8/2021    Vitamin D deficiency      Past Surgical History:   Procedure Laterality Date    Adenoidectomy  2013    Hysterectomy  12/23/2015    Partial hysterectomy-still has ovaries    Tonsillectomy  2013      Family History   Problem Relation Age of Onset    Anemia Mother     Hypertension Father     High Cholesterol Father     Dementia Maternal Grandmother     Stroke Maternal Grandmother     Breast Cancer Paternal Grandmother      High Cholesterol Paternal Grandmother     Hypertension Paternal Grandmother     Cancer Paternal Grandmother     Hypertension Paternal Grandfather     High Cholesterol Paternal Grandfather     Glaucoma Paternal Grandfather     Cancer Paternal Grandfather     Stroke Paternal Grandfather     Asthma Sister       Social History:   Social History     Socioeconomic History    Marital status: Single   Tobacco Use    Smoking status: Former     Current packs/day: 0.00     Types: Cigarettes     Quit date: 8/14/2021     Years since quitting: 3.0    Smokeless tobacco: Never   Vaping Use    Vaping status: Never Used   Substance and Sexual Activity    Alcohol use: Yes     Comment: occasionally    Drug use: Never   Other Topics Concern    Caffeine Concern No    Stress Concern Yes    Weight Concern Yes    Special Diet No    Exercise Yes    Seat Belt Yes        Medications (Active prior to today's visit):  Current Outpatient Medications   Medication Sig Dispense Refill    cephALEXin 500 MG Oral Cap Take 1 capsule (500 mg total) by mouth 2 (two) times daily. 14 capsule 0    Phentermine HCl 37.5 MG Oral Tab Take 1 tablet (37.5 mg total) by mouth every morning. 30 tablet 2    topiramate 100 MG Oral Tab Take 1 tablet (100 mg total) by mouth 2 (two) times daily. 180 tablet 1       Allergies:  No Known Allergies    ROS:     Pertinent positives and pertinent negatives are as listed in HPI.    All other review of symptoms were reviewed and negative.    PHYSICAL EXAM:   /72 (BP Location: Left arm, Patient Position: Sitting, Cuff Size: large)   Pulse 71   Temp 97.9 °F (36.6 °C)   Resp 18   Ht 5' 9\" (1.753 m)   Wt 264 lb (119.7 kg)   SpO2 98%   BMI 38.99 kg/m²     Wt Readings from Last 3 Encounters:   09/16/24 264 lb (119.7 kg)   01/08/24 263 lb (119.3 kg)   01/03/24 260 lb (117.9 kg)       BP Readings from Last 3 Encounters:   09/16/24 136/72   01/08/24 128/84   01/03/24 110/76       General: Obese, well hydrated. No acute  distress. No pallor.   HEENT: Normocephalic, atraumatic.  NAVIN, EOMI, Sclera clear and non icteric bilaterally. TM's normal, nose without congestion, pharynx without redness or exudates. Moist mucous membranes.  Neck: Supple. No lymphadenopathy. No thyromegaly. No bruits noted.  Heart: RRR without S3 or S4 or murmur.  Lungs: Clear to auscultation bilaterally. No rales, rhonchi or wheezes. No tachypnea or retractions noted.  Extremities: No edema bilaterally.  Skin: Warm and dry.  On the left upper thoracic back, superior to the scapula, the patient has an insect bite with surrounding redness and swelling but no purulent drainage noted.  She complains of pain to touch along the left paraspinal musculature.  Neuro: Alert and oriented x 3. normal gait.  Psych: Normal mood and affect.     ASSESSMENT/PLAN:   44 year old female with    LABS This Visit:    Results for orders placed or performed in visit on 12/09/23   Comp Metabolic Panel (14)    Collection Time: 12/09/23  9:01 AM   Result Value Ref Range    Glucose 100 (H) 70 - 99 mg/dL    Sodium 138 136 - 145 mmol/L    Potassium 4.0 3.5 - 5.1 mmol/L    Chloride 111 98 - 112 mmol/L    CO2 19.0 (L) 21.0 - 32.0 mmol/L    Anion Gap 8 0 - 18 mmol/L    BUN 12 9 - 23 mg/dL    Creatinine 0.97 0.55 - 1.02 mg/dL    Calcium, Total 9.2 8.5 - 10.1 mg/dL    Calculated Osmolality 286 275 - 295 mOsm/kg    eGFR-Cr 75 >=60 mL/min/1.73m2    AST 21 15 - 37 U/L    ALT 25 13 - 56 U/L    Alkaline Phosphatase 92 37 - 98 U/L    Bilirubin, Total 0.3 0.1 - 2.0 mg/dL    Total Protein 7.4 6.4 - 8.2 g/dL    Albumin 3.9 3.4 - 5.0 g/dL    Globulin  3.5 2.8 - 4.4 g/dL    A/G Ratio 1.1 1.0 - 2.0    Patient Fasting for CMP? Yes    Lipid Panel    Collection Time: 12/09/23  9:01 AM   Result Value Ref Range    Cholesterol, Total 237 (H) <200 mg/dL    HDL Cholesterol 45 40 - 59 mg/dL    Triglycerides 150 (H) 30 - 149 mg/dL    LDL Cholesterol 165 (H) <100 mg/dL    VLDL 30 0 - 30 mg/dL    Non HDL Chol 192 (H)  <130 mg/dL    Patient Fasting for Lipid? Yes    TSH W Reflex To Free T4    Collection Time: 12/09/23  9:01 AM   Result Value Ref Range    TSH 1.820 0.358 - 3.740 mIU/mL   Hemoglobin A1C [E]    Collection Time: 12/09/23  9:01 AM   Result Value Ref Range    HgbA1C 5.7 (H) <5.7 %    Estimated Average Glucose 117 68 - 126 mg/dL   Vitamin B12 [E]    Collection Time: 12/09/23  9:01 AM   Result Value Ref Range    Vitamin B12 1,903 (H) 193 - 986 pg/mL   Vitamin D, 25-Hydroxy    Collection Time: 12/09/23  9:01 AM   Result Value Ref Range    Vitamin D, 25OH, Total 6.8 (L) 30.0 - 100.0 ng/mL   CBC W/ DIFFERENTIAL    Collection Time: 12/09/23  9:01 AM   Result Value Ref Range    WBC 5.0 4.0 - 11.0 x10(3) uL    RBC 4.88 3.80 - 5.30 x10(6)uL    HGB 15.0 12.0 - 16.0 g/dL    HCT 43.9 35.0 - 48.0 %    .0 150.0 - 450.0 10(3)uL    MCV 90.0 80.0 - 100.0 fL    MCH 30.7 26.0 - 34.0 pg    MCHC 34.2 31.0 - 37.0 g/dL    RDW 11.9 %    Neutrophil Absolute Prelim 2.86 1.50 - 7.70 x10 (3) uL    Neutrophil Absolute 2.86 1.50 - 7.70 x10(3) uL    Lymphocyte Absolute 1.34 1.00 - 4.00 x10(3) uL    Monocyte Absolute 0.54 0.10 - 1.00 x10(3) uL    Eosinophil Absolute 0.23 0.00 - 0.70 x10(3) uL    Basophil Absolute 0.04 0.00 - 0.20 x10(3) uL    Immature Granulocyte Absolute 0.01 0.00 - 1.00 x10(3) uL    Neutrophil % 56.9 %    Lymphocyte % 26.7 %    Monocyte % 10.8 %    Eosinophil % 4.6 %    Basophil % 0.8 %    Immature Granulocyte % 0.2 %        1. Bug bite with infection, initial encounter    Symptomatic treatment with moist heat, Tylenol or Ibuprofen and Benadryl or Zyrtec. A prescription for Keflex 500 mg one capsule bid for one week was given.    - cephALEXin 500 MG Oral Cap; Take 1 capsule (500 mg total) by mouth 2 (two) times daily.  Dispense: 14 capsule; Refill: 0    2. Encounter for screening mammogram for malignant neoplasm of breast    - [facility] ZOE 2D+3D SCREENING BILAT (CPT=77067/29191); Future    3. Vitamin D deficiency    Her last  level on 12/9/2023 was 6.8. She is due for recheck.    - Vitamin D [E]; Future    4. Mixed hyperlipidemia    Cholesterol:     Lab Results   Component Value Date    CHOLEST 237 (H) 12/09/2023    CHOLEST 236 (H) 10/05/2021     Lab Results   Component Value Date    HDL 45 12/09/2023    HDL 47 10/05/2021     Lab Results   Component Value Date    TRIG 150 (H) 12/09/2023    TRIG 170 (H) 10/05/2021     Lab Results   Component Value Date     (H) 12/09/2023     (H) 10/05/2021     Lab Results   Component Value Date    AST 21 12/09/2023    AST 13 (L) 10/05/2021    AST 46 (H) 09/09/2021     Lab Results   Component Value Date    ALT 25 12/09/2023    ALT 21 10/05/2021    ALT 47 09/09/2021     Patient due for recheck on lipid panel.    - Lipid Panel; Future    5. Prediabetes    Lab Results   Component Value Date    A1C 5.7 (H) 12/09/2023    A1C 6.2 (H) 10/05/2021      Patient due for recheck on HgbA1C.    - Comp Metabolic Panel (14); Future  - Hemoglobin A1C [E]; Future    6. Screening for thyroid disorder    - TSH W Reflex To Free T4; Future    7. Screening, anemia, deficiency, iron    - CBC With Differential With Platelet; Future    8. Health maintenance    Patient reminded to get her flu shot and new COVID booster in October at her local pharmacy. To see me in December for her annual wellness exam.    Health Maintenance:    Health Maintenance   Topic Date Due    Mammogram  Never done    COVID-19 Vaccine (1 - 2023-24 season) Never done    Annual Physical  12/09/2024    Influenza Vaccine (1) 10/01/2024    DTaP,Tdap,and Td Vaccines (7 - Td or Tdap) 12/09/2033    Annual Depression Screening  Completed    Pneumococcal Vaccine: Birth to 64yrs  Aged Out         Meds This Visit:  Requested Prescriptions     Signed Prescriptions Disp Refills    cephALEXin 500 MG Oral Cap 14 capsule 0     Sig: Take 1 capsule (500 mg total) by mouth 2 (two) times daily.       Imaging & Referrals:  [facility] ZOE 2D+3D SCREENING BILAT  (CPT=77067/99049)     Patient understands plan and follow-up.  Follow up in 12/2024 for annual physical.    9/16/2024  Evette Cloud DO    Total time: 20 minutes including precharting, H&P, plan of care    This dictation was performed with a verbal recognition program (DRAGON) and it was checked for errors. It is possible that there are still dictated errors within this office note. If so, please bring any errors to my attention for an addendum. All efforts were made to ensure that this office note is accurate     [de-identified] : MADISON ARIAS is a 89 year old M w PMH BPH, HTN, HLD, who is referred to the office for consultation visit, he presents w the cc of having a bulge to the left side of groin. Patient states he is not having much pain to the area. He wants to be evaluated for inguinal hernia.

## 2024-11-02 DIAGNOSIS — E66.813 CLASS 3 SEVERE OBESITY WITH SERIOUS COMORBIDITY AND BODY MASS INDEX (BMI) OF 40.0 TO 44.9 IN ADULT, UNSPECIFIED OBESITY TYPE (HCC): ICD-10-CM

## 2024-11-02 DIAGNOSIS — Z51.81 ENCOUNTER FOR THERAPEUTIC DRUG MONITORING: ICD-10-CM

## 2024-11-02 DIAGNOSIS — E66.01 CLASS 3 SEVERE OBESITY WITH SERIOUS COMORBIDITY AND BODY MASS INDEX (BMI) OF 40.0 TO 44.9 IN ADULT, UNSPECIFIED OBESITY TYPE (HCC): ICD-10-CM

## 2024-11-04 RX ORDER — PHENTERMINE HYDROCHLORIDE 37.5 MG/1
37.5 TABLET ORAL EVERY MORNING
Qty: 30 TABLET | Refills: 2 | OUTPATIENT
Start: 2024-11-04

## 2024-11-04 RX ORDER — TOPIRAMATE 100 MG/1
100 TABLET, FILM COATED ORAL 2 TIMES DAILY
Qty: 180 TABLET | Refills: 1 | OUTPATIENT
Start: 2024-11-04

## 2024-11-04 NOTE — TELEPHONE ENCOUNTER
Requesting phentermine  LOV: 1/8/24  RTC: 3 month  Filled: 1/8/24 #30 with 2 refills    No future appointments.    Requesting topiramate  Filled: 1/8/24 #180 with 1 refills    No future appointments.    Denied hasn't been seen in greater than 6 monts

## 2025-01-02 ENCOUNTER — OFFICE VISIT (OUTPATIENT)
Dept: FAMILY MEDICINE CLINIC | Facility: CLINIC | Age: 45
End: 2025-01-02
Payer: COMMERCIAL

## 2025-01-02 VITALS
TEMPERATURE: 98 F | HEART RATE: 83 BPM | RESPIRATION RATE: 16 BRPM | SYSTOLIC BLOOD PRESSURE: 132 MMHG | HEIGHT: 69 IN | WEIGHT: 267 LBS | DIASTOLIC BLOOD PRESSURE: 80 MMHG | OXYGEN SATURATION: 99 % | BODY MASS INDEX: 39.55 KG/M2

## 2025-01-02 DIAGNOSIS — R53.83 FATIGUE, UNSPECIFIED TYPE: ICD-10-CM

## 2025-01-02 DIAGNOSIS — G43.009 MIGRAINE WITHOUT AURA AND WITHOUT STATUS MIGRAINOSUS, NOT INTRACTABLE: ICD-10-CM

## 2025-01-02 DIAGNOSIS — N62 HYPERTROPHY OF BREAST: ICD-10-CM

## 2025-01-02 DIAGNOSIS — R73.03 PREDIABETES: ICD-10-CM

## 2025-01-02 DIAGNOSIS — Z72.89 CURRENT EVERY DAY VAPING: ICD-10-CM

## 2025-01-02 DIAGNOSIS — E78.2 MIXED HYPERLIPIDEMIA: ICD-10-CM

## 2025-01-02 DIAGNOSIS — Z71.6 ENCOUNTER FOR TOBACCO USE CESSATION COUNSELING: ICD-10-CM

## 2025-01-02 DIAGNOSIS — R04.0 EPISTAXIS: ICD-10-CM

## 2025-01-02 DIAGNOSIS — M54.6 CHRONIC MIDLINE THORACIC BACK PAIN: ICD-10-CM

## 2025-01-02 DIAGNOSIS — M54.2 NECK PAIN: ICD-10-CM

## 2025-01-02 DIAGNOSIS — R07.89 CHEST WALL PAIN: ICD-10-CM

## 2025-01-02 DIAGNOSIS — Z28.21 REFUSED INFLUENZA VACCINE: ICD-10-CM

## 2025-01-02 DIAGNOSIS — E66.01 CLASS 2 SEVERE OBESITY DUE TO EXCESS CALORIES WITH SERIOUS COMORBIDITY AND BODY MASS INDEX (BMI) OF 39.0 TO 39.9 IN ADULT (HCC): ICD-10-CM

## 2025-01-02 DIAGNOSIS — Z00.00 WELLNESS EXAMINATION: Primary | ICD-10-CM

## 2025-01-02 DIAGNOSIS — E55.9 VITAMIN D DEFICIENCY: ICD-10-CM

## 2025-01-02 DIAGNOSIS — E66.812 CLASS 2 SEVERE OBESITY DUE TO EXCESS CALORIES WITH SERIOUS COMORBIDITY AND BODY MASS INDEX (BMI) OF 39.0 TO 39.9 IN ADULT (HCC): ICD-10-CM

## 2025-01-02 DIAGNOSIS — G89.29 CHRONIC MIDLINE THORACIC BACK PAIN: ICD-10-CM

## 2025-01-02 RX ORDER — TOPIRAMATE 100 MG/1
100 TABLET, FILM COATED ORAL 2 TIMES DAILY
Qty: 180 TABLET | Refills: 1 | Status: SHIPPED | OUTPATIENT
Start: 2025-01-02

## 2025-01-02 NOTE — PATIENT INSTRUCTIONS
Go to the ward lab for your fasting blood tests. Do not eat or drink except for water for at least 8 hours prior to the blood tests. Do not take any vitamins or Biotin for 3 days before your blood tests.    Schedule your appointment with the plastic surgeon, Dr. Le. He may want a copy of your most recent mammogram.    Restart the topiramate  100 mg twice daily for your headaches.  Send me a my chart message in 2 weeks and let me know how you are doing after restarting the medication.    Stop taking anti-inflammatory medications like Ibuprofen, Motrin, Aleve, Advil or aspirin as these can worsen your nose bleeds.    Use saline nasal spray several times a day to hydrate your nose.    Stop the Flonase until the nose bleeds stop and when you restart, use after shower.    Stop vaping.    Consider getting your flu shot at your local pharmacy.    Recommendations for exercise are 3-5 times weekly for 30-60 minutes for a minimum of 150-300 minutes.     For premenopausal women and men, 1000 mg of calcium daily is recommended. For postmenopausal women, 1200 mg of calcium daily is recommended.    To help the body absorb and use calcium, vitamin D 2000 international units daily is recommended.    I will contact you with your test results once available.

## 2025-01-02 NOTE — PROGRESS NOTES
The 21st Century Cures Act makes medical notes like these available to patients in the interest of transparency. Please be advised this is a medical document. Medical documents are intended to carry relevant information, facts as evident, and the clinical opinion of the practitioner. The medical note is intended as peer to peer communication and may appear blunt or direct. It is written in medical language and may contain abbreviations or verbiage that are unfamiliar.     Eliezer Greco is a 44 year old female who is here for   Chief Complaint   Patient presents with    Referral     Breast reduction    Fatigue    Epistaxis       HPI:     This 44-year-old female presents to the office for her annual wellness exam and follow-up on multiple concerns today.    1.  The patient is requesting a referral to a plastic surgeon.  She states she has been having worsening neck and shoulder pain, thoracic and lower back pain and chest wall pain due to her heavy breasts.  She states whenever she tries to lean over, she feels like the breasts are pulling on her chest wall.  She never has any chest pain with exertion.  There is no associated shortness of breath, dizziness or syncope.  She states she has big indentations in her shoulders from her bra.  Her most recent mammogram was done on 12/28/2024 at [facility] and she states it was normal.  There is a family history of breast cancer in her paternal grandmother.    2.  The patient has stopped her phentermine and topiramate for weight loss.  She states she has noted  she is having severe left-sided headache for the last months since she has been off the medication.  The headache sometimes will radiate to the back of her head.  It is associated with nausea but no vomiting.  She does have some light sensitivity. And since having the headaches, she is having increased nose bleeds. The patient admits to sinus congestion and does use Flonase as needed but not for the last month. She  also has been complaining of a very dry nose and is not using any saline nasal spray.  She has also be using NSAIDS more frequently due to the headaches. She denies any blood in the urine or stool or melena.  There is no family history for brain tumor.  She said previously she would get migraine headaches rarely.    3.  The patient has history for prediabetes and mixed hyperlipidemia.  She never went for her repeat laboratory which was ordered in September.    4.  She has history for vitamin D deficiency.  She admits to fatigue.  She is not currently taking any vitamin D supplements.    5.  The patient has not received a flu shot and declines it today.  She has never been vaccinated for COVID and does not want to ever receive the COVID-vaccine.    6.  She is scheduled to see her gynecologist for her Pap smear on 2025.    Exercise: none.  Diet: doesn't watch  Sleep: 6 hours     Depression/Anxiety:   PHQ-2 SCORE: 2  , done 2025   Little interest or pleasure in doing things: 1    Feeling down, depressed, or hopeless: 1         Fall Risk: No falls last 3 months  Gun in home:No falls last 3 months              Glasses/contacts:Glasses             Recent eye doctor visit:Scheduled 2025   Hearing aids:No    Family History for:  Breast ca-PGM  Ovarian ca-No  Uterine ca-No  Colon ca-No  Prostate ca-PGF      FDLMP s/p hysterectomy for fibroids and heavy bleeding, still have ovaries  Last pap smear: -see gyn- scheduled on 2025  Last Mammogram: 2024  normal per patient at [facility]    Colonoscopy: Never      Past Medical History:    Allergic rhinitis    Anxiety    Depression    Obesity    Pneumonia due to COVID-19 virus    Tobacco user    Quit 2021    Vitamin D deficiency       Past Surgical History:   Procedure Laterality Date    Adenoidectomy  2013    Hysterectomy  2015    Partial hysterectomy-still has ovaries    Tonsillectomy  2013       Family History   Problem Relation Age of  Onset    Anemia Mother     Hypertension Father     High Cholesterol Father     Dementia Maternal Grandmother     Stroke Maternal Grandmother     Breast Cancer Paternal Grandmother     High Cholesterol Paternal Grandmother     Hypertension Paternal Grandmother     Cancer Paternal Grandmother     Hypertension Paternal Grandfather     High Cholesterol Paternal Grandfather     Glaucoma Paternal Grandfather     Cancer Paternal Grandfather     Stroke Paternal Grandfather     Asthma Sister        Social History     Socioeconomic History    Marital status: Single   Tobacco Use    Smoking status: Former     Current packs/day: 0.00     Types: Cigarettes     Quit date: 8/14/2021     Years since quitting: 3.3    Smokeless tobacco: Never   Vaping Use    Vaping status: Every Day    Substances: Nicotine    Devices: Disposable   Substance and Sexual Activity    Alcohol use: Yes     Comment: occasionally    Drug use: Never   Other Topics Concern    Caffeine Concern No    Stress Concern Yes    Weight Concern Yes    Special Diet No    Exercise Yes    Seat Belt Yes     Works as a banker    Medications:    Medications Ordered Prior to Encounter[1]    Allergies:    Allergies[2]    REVIEW OF SYSTEMS:     ROS is negative except as stated in HPI.      EXAM:   /80 (BP Location: Left arm, Patient Position: Sitting, Cuff Size: large)   Pulse 83   Temp 98.1 °F (36.7 °C)   Resp 16   Ht 5' 9\" (1.753 m)   Wt 267 lb (121.1 kg)   SpO2 99%   BMI 39.43 kg/m²     Wt Readings from Last 3 Encounters:   01/02/25 267 lb (121.1 kg)   09/16/24 264 lb (119.7 kg)   01/08/24 263 lb (119.3 kg)       BP Readings from Last 3 Encounters:   01/02/25 132/80   09/16/24 136/72   01/08/24 128/84        Visual Acuity  Right Eye Visual Acuity: Corrected Right Eye Chart Acuity: 20/50   Left Eye Visual Acuity: Corrected Left Eye Chart Acuity: 20/50   Both Eyes Visual Acuity: Corrected Both Eyes Chart Acuity: 20/50   Able To Tolerate Visual Acuity: Yes       Weight is up 4 # from 1/8/2024 OV    General: WH/obese/WD, in NAD, A and O times 3  HEAD: Normocephalic, atraumatic  EYES: NAVIN, EOMI, conjunctiva normal, sclera anicteric.  EARS: Tympanic membranes normal, EAC's normal.  NOSE: Turbinates very dry, no bleeding noted.  PHARYNX:  No eythema or exudates, mucous membrane moist, airway patent.  NECK:  No cervical lymphadenopathy or thyromegaly, no bruits noted.  HEART: Regular rate and rhythm, no S3, S4 or murmur noted.  LUNGS: Clear to ausculation. No retractions or tachypnea noted.  BREASTS: deferred-see gyne  ABDOMEN: Soft, obese, nontender, no guarding, rigidity or rebound, no masses or hepatosplenomegaly, normal bowel sounds in all four quadrants.  : deferred-sees gyne  BACK: No tenderness over the thoracic or lumbar spine. No scoliosis noted.  EXTREMITIES: No clubbing, cyanosis, edema noted. Motor strength +5/5 in all 4 extremities. DTR's +2/4 in all 4 extremities. Able to toe and heel walk.  SKIN: Warm and dry. Tattoo noted on lumbar spine  NEURO: Cr. N. II-XII intact, normal gait  PSYCH: Normal affect and mood.      The 10-year ASCVD risk score (Marlena ANTONIO, et al., 2019) is: 1.6%    Values used to calculate the score:      Age: 44 years      Sex: Female      Is Non- : Yes      Diabetic: No      Tobacco smoker: No      Systolic Blood Pressure: 132 mmHg      Is BP treated: No      HDL Cholesterol: 45 mg/dL      Total Cholesterol: 237 mg/dL    ASSESSMENT AND PLAN:     1. Wellness examination  -We discussed the following:  Healthy diet and exercise, cancer screening, self breast exams and calcium and vitamin D supplementation. I encouraged the patient to consider getting her flu shot at her local pharmacy.  The patient was reminded to go for the fasting laboratory which I had ordered in September.    2. Prediabetes    Lab Results   Component Value Date    A1C 5.7 (H) 12/09/2023    A1C 6.2 (H) 10/05/2021      The patient is due for  recheck on her hemoglobin A1c.    3. Mixed hyperlipidemia    Cholesterol:     Lab Results   Component Value Date    CHOLEST 237 (H) 12/09/2023    CHOLEST 236 (H) 10/05/2021     Lab Results   Component Value Date    HDL 45 12/09/2023    HDL 47 10/05/2021     Lab Results   Component Value Date    TRIG 150 (H) 12/09/2023    TRIG 170 (H) 10/05/2021     Lab Results   Component Value Date     (H) 12/09/2023     (H) 10/05/2021     Lab Results   Component Value Date    AST 21 12/09/2023    AST 13 (L) 10/05/2021    AST 46 (H) 09/09/2021     Lab Results   Component Value Date    ALT 25 12/09/2023    ALT 21 10/05/2021    ALT 47 09/09/2021     The patient is due for recheck on her lipid panel.      4. Vitamin D deficiency    Last vitamin D level on 12/9/2023 was 6.8. The patient was treated with prescription vitamin D 50,000 units once weekly for 12 weeks and then was instructed to take vitamin D 2000 units daily over-the-counter.  She is not currently doing so.  She is due for recheck on her vitamin D level.    5. Epistaxis    I told the patient her nosebleeds are probably multifactorial.  She does have very dry membranes.  She has been taking anti-inflammatory medications for her headaches and using Flonase.  I told her to hold the Flonase for her sinus/allergy symptoms until the nosebleeds stop.  She is to use saline nasal spray multiple times a day to hydrate her nose.  And she should stop taking anti-inflammatory medicines.  She may only take Tylenol for pain.    6. Hypertrophy of breast  7. Neck pain  8. Chronic midline thoracic back pain  9. Chest wall pain    The patient is complaining of worsening neck, shoulder, thoracic, lumbar and chest wall pain due to the heaviness of her breasts.  I have referred her to Dr. Le of plastic surgery.  I did tell her he may want a copy of her most recent mammogram.    - Plastic Surgery Referral - External    10. Migraine without aura and without status migrainosus, not  intractable    I refilled the patient's topiramate 100 mg twice daily.  She had been taking it for weight loss management but I suspect it was also keeping her migraines under control.  She is to send me a Sellf message in 2 weeks to give me an update on her headaches.    11. Fatigue, unspecified type    The patient is due for all of her laboratory.  She had significant vitamin D deficiency previously.    12. Class 2 severe obesity due to excess calories with serious comorbidity and body mass index (BMI) of 39.0 to 39.9 in adult (HCC)    The patient has stopped her phentermine.  I encouraged her to increase her activity and monitor her diet.    13. Current every day vaping  14. Encounter for tobacco use cessation counseling    I encouraged the patient to stop vaping.  We discussed that the chemical deposition involved in vaping can cause scarring and damage to the lungs.    - Smoking Cessation less than 3 minutes    14. Refused influenza vaccine    The patient refused her flu shot today.  The patient states she does not ever want to receive a COVID-vaccine.    - INFLUENZA REFUSED [facility]         Health Maintenance:    Health Maintenance   Topic Date Due    Mammogram  12/28/2025    Influenza Vaccine (1) 06/30/2025    Annual Physical  01/02/2025    Annual Depression Screening  Completed    DTaP,Tdap,and Td Vaccines (7 - Td or Tdap) 12/09/2033    Pneumococcal Vaccine: Birth to 64yrs  Aged Out         Orders This Visit:  Orders Placed This Encounter   Procedures    INFLUENZA REFUSED [facility]    Smoking Cessation less than 3 minutes       Meds This Visit:  Requested Prescriptions     Signed Prescriptions Disp Refills    topiramate 100 MG Oral Tab 180 tablet 1     Sig: Take 1 tablet (100 mg total) by mouth 2 (two) times daily.       Imaging & Referrals:  INFLUENZA REFUSED [facility]  PLASTIC SURGERY - EXTERNAL       The patient indicates understanding of these issues and agrees to the plan.  The patient is asked to return pending lab  results.  Evette Cloud DO    Total time: 60 minutes including precharting, H&P, plan of care.    This dictation was performed with a verbal recognition program (DRAGON) and it was checked for errors. It is possible that there are still dictated errors within this office note. If so, please bring any errors to my attention for an addendum. All efforts were made to ensure that this office note is accurate         [1]   No current outpatient medications on file prior to visit.     No current facility-administered medications on file prior to visit.   [2] No Known Allergies

## 2025-01-03 ENCOUNTER — LAB ENCOUNTER (OUTPATIENT)
Dept: LAB | Age: 45
End: 2025-01-03
Attending: FAMILY MEDICINE
Payer: COMMERCIAL

## 2025-01-03 DIAGNOSIS — E55.9 VITAMIN D DEFICIENCY: ICD-10-CM

## 2025-01-03 DIAGNOSIS — Z13.0 SCREENING, ANEMIA, DEFICIENCY, IRON: ICD-10-CM

## 2025-01-03 DIAGNOSIS — E78.2 MIXED HYPERLIPIDEMIA: ICD-10-CM

## 2025-01-03 DIAGNOSIS — R73.03 PREDIABETES: ICD-10-CM

## 2025-01-03 DIAGNOSIS — Z13.29 SCREENING FOR THYROID DISORDER: ICD-10-CM

## 2025-01-03 LAB
ALBUMIN SERPL-MCNC: 4.3 G/DL (ref 3.2–4.8)
ALBUMIN/GLOB SERPL: 1.5 {RATIO} (ref 1–2)
ALP LIVER SERPL-CCNC: 97 U/L
ALT SERPL-CCNC: 19 U/L
ANION GAP SERPL CALC-SCNC: 9 MMOL/L (ref 0–18)
AST SERPL-CCNC: 19 U/L (ref ?–34)
BASOPHILS # BLD AUTO: 0.02 X10(3) UL (ref 0–0.2)
BASOPHILS NFR BLD AUTO: 0.4 %
BILIRUB SERPL-MCNC: 0.4 MG/DL (ref 0.3–1.2)
BUN BLD-MCNC: 14 MG/DL (ref 9–23)
CALCIUM BLD-MCNC: 9.4 MG/DL (ref 8.7–10.4)
CHLORIDE SERPL-SCNC: 104 MMOL/L (ref 98–112)
CHOLEST SERPL-MCNC: 258 MG/DL (ref ?–200)
CO2 SERPL-SCNC: 25 MMOL/L (ref 21–32)
CREAT BLD-MCNC: 0.97 MG/DL
EGFRCR SERPLBLD CKD-EPI 2021: 75 ML/MIN/1.73M2 (ref 60–?)
EOSINOPHIL # BLD AUTO: 0.23 X10(3) UL (ref 0–0.7)
EOSINOPHIL NFR BLD AUTO: 4.1 %
ERYTHROCYTE [DISTWIDTH] IN BLOOD BY AUTOMATED COUNT: 12.2 %
EST. AVERAGE GLUCOSE BLD GHB EST-MCNC: 120 MG/DL (ref 68–126)
FASTING PATIENT LIPID ANSWER: YES
FASTING STATUS PATIENT QL REPORTED: YES
GLOBULIN PLAS-MCNC: 2.9 G/DL (ref 2–3.5)
GLUCOSE BLD-MCNC: 102 MG/DL (ref 70–99)
HBA1C MFR BLD: 5.8 % (ref ?–5.7)
HCT VFR BLD AUTO: 44.6 %
HDLC SERPL-MCNC: 59 MG/DL (ref 40–59)
HGB BLD-MCNC: 14.5 G/DL
IMM GRANULOCYTES # BLD AUTO: 0.02 X10(3) UL (ref 0–1)
IMM GRANULOCYTES NFR BLD: 0.4 %
LDLC SERPL CALC-MCNC: 169 MG/DL (ref ?–100)
LYMPHOCYTES # BLD AUTO: 2.12 X10(3) UL (ref 1–4)
LYMPHOCYTES NFR BLD AUTO: 37.7 %
MCH RBC QN AUTO: 30.1 PG (ref 26–34)
MCHC RBC AUTO-ENTMCNC: 32.5 G/DL (ref 31–37)
MCV RBC AUTO: 92.7 FL
MONOCYTES # BLD AUTO: 0.35 X10(3) UL (ref 0.1–1)
MONOCYTES NFR BLD AUTO: 6.2 %
NEUTROPHILS # BLD AUTO: 2.88 X10 (3) UL (ref 1.5–7.7)
NEUTROPHILS # BLD AUTO: 2.88 X10(3) UL (ref 1.5–7.7)
NEUTROPHILS NFR BLD AUTO: 51.2 %
NONHDLC SERPL-MCNC: 199 MG/DL (ref ?–130)
OSMOLALITY SERPL CALC.SUM OF ELEC: 287 MOSM/KG (ref 275–295)
PLATELET # BLD AUTO: 350 10(3)UL (ref 150–450)
POTASSIUM SERPL-SCNC: 4.5 MMOL/L (ref 3.5–5.1)
PROT SERPL-MCNC: 7.2 G/DL (ref 5.7–8.2)
RBC # BLD AUTO: 4.81 X10(6)UL
SODIUM SERPL-SCNC: 138 MMOL/L (ref 136–145)
TRIGL SERPL-MCNC: 164 MG/DL (ref 30–149)
TSI SER-ACNC: 2.54 UIU/ML (ref 0.55–4.78)
VIT D+METAB SERPL-MCNC: 10.8 NG/ML (ref 30–100)
VLDLC SERPL CALC-MCNC: 33 MG/DL (ref 0–30)
WBC # BLD AUTO: 5.6 X10(3) UL (ref 4–11)

## 2025-01-03 PROCEDURE — 80053 COMPREHEN METABOLIC PANEL: CPT

## 2025-01-03 PROCEDURE — 85025 COMPLETE CBC W/AUTO DIFF WBC: CPT

## 2025-01-03 PROCEDURE — 80061 LIPID PANEL: CPT

## 2025-01-03 PROCEDURE — 84443 ASSAY THYROID STIM HORMONE: CPT

## 2025-01-03 PROCEDURE — 83036 HEMOGLOBIN GLYCOSYLATED A1C: CPT

## 2025-01-03 PROCEDURE — 36415 COLL VENOUS BLD VENIPUNCTURE: CPT

## 2025-01-03 PROCEDURE — 82306 VITAMIN D 25 HYDROXY: CPT

## 2025-01-06 DIAGNOSIS — E55.9 VITAMIN D DEFICIENCY: Primary | ICD-10-CM

## 2025-01-06 RX ORDER — ERGOCALCIFEROL 1.25 MG/1
50000 CAPSULE, LIQUID FILLED ORAL WEEKLY
Qty: 12 CAPSULE | Refills: 0 | Status: SHIPPED | OUTPATIENT
Start: 2025-01-06 | End: 2025-03-31

## 2025-02-10 ENCOUNTER — PATIENT MESSAGE (OUTPATIENT)
Dept: FAMILY MEDICINE CLINIC | Facility: CLINIC | Age: 45
End: 2025-02-10

## 2025-02-10 DIAGNOSIS — R07.89 CHEST WALL PAIN: ICD-10-CM

## 2025-02-10 DIAGNOSIS — M54.2 NECK PAIN: ICD-10-CM

## 2025-02-10 DIAGNOSIS — M54.6 CHRONIC MIDLINE THORACIC BACK PAIN: ICD-10-CM

## 2025-02-10 DIAGNOSIS — G89.29 CHRONIC MIDLINE THORACIC BACK PAIN: ICD-10-CM

## 2025-02-10 DIAGNOSIS — N62 HYPERTROPHY OF BREAST: Primary | ICD-10-CM

## 2025-02-10 NOTE — TELEPHONE ENCOUNTER
Patient would like a different referral for plastic surgery. Dr Le's office does not accept insurance, pay is out of pocket.     Please review and advise.

## 2025-03-09 NOTE — PROGRESS NOTES
Eliezer Greco is a 44 year old female presents today for restart on medical weight loss program for the treatment of overweight, obesity, or morbid obesity with associated prediabetes, hypercholesterolemia.    S:  Current weight   Wt Readings from Last 6 Encounters:   03/10/25 269 lb (122 kg)   01/02/25 267 lb (121.1 kg)   09/16/24 264 lb (119.7 kg)   01/08/24 263 lb (119.3 kg)   01/03/24 260 lb (117.9 kg)   12/09/23 256 lb (116.1 kg)    AND BMI Body mass index is 39.72 kg/m²..    Patient has lost -6# since LOV in 1/2024 at 263#. She has been off medication. Denies any changes to health. Never completed the sleep study. Recent labs reviewed in EMR with noted worsening prediabetes and persistent dyslipidemia.    Testing/consult completed since LOV: Dietician: no.    Labs: prediabetes, Vitamin D deficiency, hypercholesterolemia    Social hx and PMH reviewed. Employed in [employer]. Partner with adult step daughter.    [facility] Follow Up    Question 3/3/2025 11:12 AM CDT - Filed by Patient   Please describe a success moment: Maintaining weight loss despite medication   Please describe a challenging moment/needs for improvement: Maintaining weight loss under stressful circumstances   Please complete this 24 hour food journal, listing everything you had to eat in the past day. Include the average time of day you ate these meals at    List foods, qty and prep for breakfast: Nothing   List foods, qty and prep for lunch. 1 sushi roll   List foods, qty and prep for dinner. 6 boneless wings, 7 pretzel bis with beer cheese   List foods, qty and prep for snacks. 2 tablespoons of pork fried rice   List the types and qty of fluids consumed 40 ounces of water, 16 ounces of apple juice, 32 ounces Dr pepper   On average, how many meals did you eat out per week? 2   Exercise    How many days per week are you active or exercise 1   On average, how many days were anaerobic (strength/resistance) exercises  performed? 0   On average, how many days were aerobic (cardio) exercises performed? 1   Perceived level of exertion on a scale of 1-5, with 5 being very intense: 3   Stress    Average stress level on a scale of 1-10, with 10 being extremely stressed: 8   If greater than 5/1O how would you grade your coping mechanisms? fair   Sleep hours and integrity    How many hours of uninterrupted sleep do you get a night: 4   Do you feel rested in the morning: No   If no, what may have been disrupting your sleep? Unsure   Please list any goal(s) for your next visit Lose weight, better sleep and coping mechanisms from stress       REVIEW OF SYSTEMS:  GENERAL: feels well otherwise  LUNGS: denies shortness of breath with exertion  CARDIOVASCULAR: denies chest pain on exertion, denies palpitations or pedal edema  GI: denies abdominal pain.  No N/V/D/C  MUSCULOSKELETAL: no acute joint or muscle pain  NEURO: denies headaches  PSYCH: denies change in behavior or mood, denies feeling sad or depressed    EXAM:  /84   Pulse 98   Resp 16   Ht 5' 9\" (1.753 m)   Wt 269 lb (122 kg)   BMI 39.72 kg/m²   GENERAL: well developed, well nourished, in no apparent distress, obese  EYES: conjunctiva pink, sclera non icteric, PERRLA  LUNGS: CTA in all fields, breathing non labored  CARDIO: RRR without murmur, normal S1 and S2 without clicks or gallops, no pedal edema.  GI: +BS  NEURO/MS: motor and sensory grossly intact  PSYCH: pleasant, cooperative, normal mood and affect    ASSESSMENT AND PLAN:  Reviewed Initial Weight Data and Goal Weight Loss:       Encounter Diagnoses   Name Primary?    Encounter for therapeutic drug monitoring Yes    Class 3 severe obesity with serious comorbidity and body mass index (BMI) of 40.0 to 44.9 in adult, unspecified obesity type (HCC)     Mixed hyperlipidemia     Prediabetes     Snoring          No orders of the defined types were placed in this encounter.      Meds & Refills for this Visit:  Requested  Prescriptions     Signed Prescriptions Disp Refills    naltrexone 50 MG Oral Tab 30 tablet 3     Sig: Take 1 tablet (50 mg total) by mouth daily. For cravings. See discharge instructions for starting dose.    buPROPion  MG Oral Tablet 24 Hr 30 tablet 3     Sig: Take 1 tablet (150 mg total) by mouth every morning.       Imaging & Consults:  OP REFERRAL TO DIETITIAN EMG WLC (WLC USE ONLY)  OP REFERRAL TO DIAGNOSTIC SLEEP STUDY      Plan:  Patient has lost -6# since LOV in 1/2024 off medication with a total weight loss of -16# since initial consult on 1/18/23 with initial weight of 279#.  Labs reviewed by PCP in EMR. Weight loss goal:  lose 30-40# in 6 months, # in 1 year.  Start Nal/Bup XL as directed. Refer for sleep study and dietician consult. Hx of Topamax, Phentermine. No AOM coverage.  on tracking nutrition, self monitoring. See patient instructions below for additional plans and patient counseling.      Patient Instructions   Continue making lifestyle changes that focus on good nutrition, regular exercise and stress management.    Medication Plan: Start generic alternative to Contrave (www.contrave.com) with Naltrexone and Bupropion XL. Contrave is an AOM that has been on the market for 10 years. Naltrexone and Bupropion XL help to reduce hunger and cravings by action in the brain. The most common side effects can include nausea, dry mouth and constipation. Please do not take Naltrexone with any opioid based pain medication as the effect of the pain medication will likely be blocked by Naltrexone. It is safe to take Naltrexone with an over the counter pain medication, if needed, as this will not be affected. Start Naltrexone at 1/2 tab daily for 14 days and then increase to a full tab as prescribed.    Next steps to work on before next office visit include: Schedule sleep study and consult with one of our dieticians, Rhianna or Derick.    Start and/or continue tracking nutrition to remain  accountable for both quality and quantity of food. Recommend setting weekly weight loss goal to 1.5-2# and caution adding your exercise, as the gisell may overestimate your daily calories. Use a food scale, measuring cups and spoons. Purchase serving dishes that are 1/4 cup, 1/2 cup and 1 cup servings. Use an gisell such as Junar (www.mynetdiary.com), [employer], or LoseIT! to provide accountably, structure and support. Consider a consult or follow up with one of our dieticians to help modify and/or support your nutrition plan for weight loss as well as maintenance.    I recommend self monitoring to support behavior change and to learn how your environment individually impacts YOUR body. This will help promote intuitive eating practices. Goal is to track nutrition 2x/week via paper log or using an gisell such as Junar (www.mynetdiary.com) or LoseIT!. I also advise checking and logging a weekly home scale weight. Support via our counseling and dietician teams are available with a referral. Please let me know if this individualized care is desired.      Self-Monitoring - The Way to Successful Weight Management    By Niurka York RD, LDN, Isidra Alfonso RD, ROSALION, Angel Villavicencio PA-C, and Rika Anders MD    OAC www.obesityaction.org Winter 2008 Resource    One major and possibly most important behavioral interventional strategy for weight management and lifestyle change is self-monitoring. Behavioral interventions are a central aspect in treatments to promote lifestyle changes that lead to weight-loss, prevent weight gain or weight regain and improve physical fitness. In the past, self-monitoring has unfortunately been one of the least popular techniques for those in weight management programs, and in some cases it is even thought of as a punishment. Because self-monitoring is critical for success with lifestyle changes, it is important to look at the various self monitoring techniques.    What is  Self-monitoring?  Self-monitoring refers to the observing and recording of eating and exercise patterns, followed by feedback on the behaviors. The goal of self-monitoring is to increase self-awareness of target behaviors and outcomes, thus it can serve as an early warning system if problems are arising and can help track success. Some commonly used self-monitoring techniques include:    Food diaries  Regular self-weighing  Exercise logs  Equipment such as pedometers, accelerometers and metabolic devices  Food Logs and Diaries  One of the most common and important types of self-monitoring strategies in weight management programs is keeping a food log, in which individuals record foods, exercises or beverages as soon as they are consumed.    One important technique with food logs is individuals recording what they eat or drink as it is consumed; otherwise it may not give an accurate account of the day’s intake. A good “rule of thumb” for food logs is: “if you bite it, you write it!”    The minimum information for weight-loss that should be kept in food logs is type, amount and caloric content of food or beverage consumed. This provides the ability to track and balance the number of calories consumed throughout the day with the amount of calories expended throughout the day.    Other nutritional information that can be logged includes: time of day of eating, fat content and carbohydrate grams. Disease-specific food logs can also be kept. For example: focusing on carbohydrate content instead of calories for patients with diabetes or insulin resistance.    Food Diaries  Another helpful tool in self-monitoring is keeping a food diary. Food diaries differ from food logs because they include more detailed information. They are useful if you are trying to find behavioral reasons or psychological aspects for eating.    Depending on the person and behavioral complexities involved, some food diaries could include the stress  level, mood or feelings surrounding eating, activity or location or other environmental or emotional triggers for eating. The more complex or detailed, the better the feedback.    However, in today’s society it is almost impossible for most people to keep highly detailed daily food records over the long-term, therefore, compliance is often very low with detailed food diaries. By suggesting that patients keep a detailed food record for a few days each week, perhaps major areas of focus for nutritional and behavioral intervention can be recognized.    Logging Your Food Online  Online food logs and diaries or computer software are quick and convenient ways to keep records of foods consumed in our technologically advanced world. Many Web sites are available for tracking of foods and calories throughout the day, some of which are free and very easy to use.    You can look up food choices and/or alternative choices in online databases of more than 50,000 foods. Internet-savvy loggers may choose to keep their journals online. Others may just choose to use these databases as a more convenient way of looking up nutritional value of foods. Some free online diaries include:    Free Web sites for searching nutritional information are available, an example is www.Ogin. These Web sites may also offer exercise tracking and ideas, support, motivational tips and chat or discussion rooms.    Hand-held Calorie Counters  Another option for those who are “on the go” are handheld devices for calorie counting. Some of the devices are stand-alone such as CalorieSmart® or HealthFitCounter®. Others need to connect to Web sites. Other devices are installed in your Palm or Pocket-PC such as Diet Diary by [employer]. They let you download updates when nutrition facts change, however, some of them use a lot of memory.    Regular Weighing  Weighing yourself is an important and simple self-monitoring behavior to serve as reminder of  one’s eating and physical activity habits. Although it may be hard and sometimes discouraging to weigh yourself while losing weight, it is recommended to weigh yourself weekly, preferably outside of the home on the same scale.    Using the scale at the local gym or exercise facility or your doctor’s office may be more accurate than home scales. However, if this is unrealistic, it is okay to use a home scale. Try to weigh yourself at the same time of day and the same day of the week.    Writing down your weekly weights on a table, graph or calendar can help you keep track of your success or to help you get back on track more quickly. It is important to note that weighing yourself more frequently than weekly is not recommended, as day to day fluctuations are not indicators of actual weight. Regular monitoring of your weight is also essential to help you maintain your weight after losing weight.    Exercise Logs  Another self-monitoring technique, along the same lines as food logs and diaries, is keeping an exercise log or diary. The number of minutes engaged and type and level of exertion of physical activity should ideally be recorded.    An important and often forgotten aspect of exercise logs is the level of perceived exertion. Walking for 30 minutes, at an easy compared to a hard pace, will result in different levels of calories burned and cardiovascular impact.    Typically, an easy physical activity that does not increase heart rate much, or alter breathing would usually be the pace that you walk around work or go shopping. Moderate level of physical exertion is when you are getting a mildly increased heart and breathing rate. Heavy or hard level of physical exertion would be sweating, increased heart rate (target heart rate range) as well as increased breathing.    Remember, physical activity can be done at one time or intermittently throughout the day. Logging exercise can be a positive feedback or a reminder  to incorporate more exercise or physical activity into your daily routine.    Initial activities may be walking, riding a stationary bike or swimming at a slow pace. Other types of exercise that can be fun are dancing, exercise videos or chair exercises. You should try to aim for 30 minutes of exercise on most days of the week.    Many people try to start out with exercise on three or four days of the week. However, if you can get yourself exercising most to all days of the week, even if only for 10 or 15 minutes, it will become more of a routine for you.    Healthy Lifestyle Tip  All adults should set a long-term goal to accumulate at least 30 minutes or more of moderate-intensity physical activity on most to all days of the week. Also, try to increase activities of daily living such as taking the stairs instead of the elevator, parking further away or walking to a bathroom that is further from your desk. Reducing sedentary time is a good strategy to increase activity by undertaking frequent, less strenuous activities. With time, you may be able to engage in more strenuous activities.    Pedometers  Self-monitoring tools are becoming more and more popular and accurate. One of the simplest of these self-monitoring tools is a pedometer. Pedometers give objective data of physical activity throughout the day. Pedometers can be found in almost any consumer catalog or retail store.    Some of the more popular manufactures include Digi-Walker, B[employer], [employer], Peer.im, Chirpme, [employer], Freestyle, CanWeNetwork, StartupDigest and many others. ClickOn and Cross[employer] make pedometer of speedometer devices that calculate steps and speed using GPS. These clip-on devices are inexpensive, ranging from less than $15 up to $75.    Many people get an average of 3,000 steps per day with daily activity. In order to burn off extra calories for weight-loss, walking 10,000 steps per day is recommended. For regular health, a minimum of  6,000 steps per day is required. Research suggests that a deliberate walk of 4,000-6,000 steps will help with weight-loss. It is often a good idea to keep track of your daily steps taken in your exercise log.    Pedometers can be frustrating for those who are more interested in distance traveled. Focusing on the number of steps and ways to incorporate more steps throughout the day will make as much of a difference with weight-loss as actual distance does. Pedometers encourage people to find ways to add more steps throughout the day.    Because step counting is becoming more popular, advances are being made in the technology behind pedometers. New pedometers display steps and count them accurately. They are meant to be worn everyday and all day, as motivation to keep stepping, Most are small and comfortable to wear.    Pedometers sense your body motion, counting your footsteps usually by a turned pendulum technology, a coiled spring mechanism and a hairspring mechanism (which is the least accurate). The unit should be accurate in its count when you wear it correctly. You may need to experiment with where to wear it. You can measure your stride and then the pedometer can estimate distance traveled.    Some pedometers today offer multifunction options like calorie estimates, clocks, timers, stopwatches, speed estimators, seven-day memory or pulse rate readers, voice feedback and radios.    Accelerometers  Although pedometers are very cost-effective, one of the main flaws in using pedometers, however, is that they do not record intensity (how hard) or duration (how long) or frequency (how often) movement occurs. Accelerometers are devices that can objectively measure frequency, duration and intensity of physical activity.    Accelerometers provide a high level of accuracy when assessing physical activity. There are a variety of commercially available accelerometers, or activity monitors, which come in a wide range of  prices anywhere from $50 to $1,000. Che[employer]er and Nike are examples of affordable accelerometers.    Many of the more expensive accelerometers are used only in research or as a part of a hospital-based program. These monitors are more complex than pedometers in that they display and store more complex data. Some are designed to download to a computer for analysis of intensity levels, movements and physical activity patterns. They can also be used to estimate calories burned or energy expenditure.    Accelerometers have sophisticated sensors that convert physical movement into an electrical signal that is relative to the muscular force needed to produce the work. Accelerometers can be found in uniaxial or triaxial measures. Uniaxial accelerometers measure in a single plane and can be attached to the trunk or limbs. Triaxial accelerometers measure along three planes: vertical, medial-lateral and anterior-posterior.    Although accelerometers are a step up from pedometers in accuracy of physical activity, they cannot register resistance. Therefore, if you are strength training or adding resistance to your bike or treadmill or adding an incline to your walking, it will not be able to discern the added level of energy required to do that work.    Metabolic Devices  One of the most accurate and most expensive tools for self-monitoring are tools that have very sophisticated monitoring and interpreting sensors for calories burned. Many of these devices have options to subscribe to a Web-based calorie counter system that integrates the amount of calories burned measured by the equipment and your calories consumed that you enter in easy to use food logs.    These devices are more accurate in measurements of calories expended because they use not only accelerometer technology, but also heat flux sensors, galvanic skin response (to measure physical exertion and emotional stimuli) and skin temperature gauges. Some also include  heart rate monitoring techniques. All of these technologies combined lead to a very accurate measurement of calories expended throughout the day.    These devices can determine if you are sitting, sleeping, jogging, walking, lifting weights or riding in a car. Many of these devices are very expensive and used primarily for research, however, some are available commercially.    This technology is also employed by hospital-based programs. Patients wear the hospital’s armband and track their nutrition on the Web site or computer-based program for typically one to two weeks. When they return to the clinic, the information will be uploaded and the practitioners will be able to work with the patients with objective data on metabolic lifestyle patterns.    Practitioners can also monitor patients on integrated software applications to provide consultations without being face to face. Practitioners have the ability to set daily goals to tailor programs to the individual patient. These are great tools to help objectively monitor behavior and physical activity, as well as providing real time feed back to the patient. [employer] is one company that offers this technology.    Conclusion  Although specific diseases and treatments vary, behavior modification is the major key in weight-loss or prevention and decreases the risk of diseases. Self-monitoring is a key to behavior modifications, and there are a multitude of ways to self-monitor. With technology advancements, self-monitoring techniques are changing and improving to help defeat some of the major barriers to compliance. The bottom line is that no matter how you do it, self-monitoring should be an important part of your weight-loss, weight maintenance or healthy lifestyle change. Then, the next step is to be sure the self-monitoring translates into positive behavior changes with regards to diet and exercise.            Medication use and SEs reviewed with patient.    Return  in about 4 months (around 7/10/2025) for weight management via clinic or Telemedicine Visit and in clinic in October.    Patient verbalizes understanding.    DOCUMENTATION OF TIME SPENT: Code selection for this visit was based on time spent : 25 minutes on date of service in preparing to see the patient, obtaining and/or reviewing separately obtained history, performing a medically appropriate examination, counseling and educating the patient/family/caregiver, ordering medications or testing, referring and communicating with other healthcare providers, documenting clinical information in the electronic medical record, independently interpreting results and communicating results to the patient/family/caregiver and care coordination with the patient's other providers.      Answers submitted by the patient for this visit:  Medical Weight Loss Follow Up (Submitted on 3/3/2025)  If greater than 5/1O how would you grade your coping mechanisms?: fair

## 2025-03-10 ENCOUNTER — OFFICE VISIT (OUTPATIENT)
Dept: INTERNAL MEDICINE CLINIC | Facility: CLINIC | Age: 45
End: 2025-03-10
Payer: COMMERCIAL

## 2025-03-10 VITALS
DIASTOLIC BLOOD PRESSURE: 84 MMHG | SYSTOLIC BLOOD PRESSURE: 136 MMHG | RESPIRATION RATE: 16 BRPM | HEART RATE: 98 BPM | HEIGHT: 69 IN | BODY MASS INDEX: 39.84 KG/M2 | WEIGHT: 269 LBS

## 2025-03-10 DIAGNOSIS — E66.01 CLASS 3 SEVERE OBESITY WITH SERIOUS COMORBIDITY AND BODY MASS INDEX (BMI) OF 40.0 TO 44.9 IN ADULT, UNSPECIFIED OBESITY TYPE (HCC): ICD-10-CM

## 2025-03-10 DIAGNOSIS — R06.83 SNORING: ICD-10-CM

## 2025-03-10 DIAGNOSIS — E66.813 CLASS 3 SEVERE OBESITY WITH SERIOUS COMORBIDITY AND BODY MASS INDEX (BMI) OF 40.0 TO 44.9 IN ADULT, UNSPECIFIED OBESITY TYPE (HCC): ICD-10-CM

## 2025-03-10 DIAGNOSIS — R73.03 PREDIABETES: ICD-10-CM

## 2025-03-10 DIAGNOSIS — Z51.81 ENCOUNTER FOR THERAPEUTIC DRUG MONITORING: Primary | ICD-10-CM

## 2025-03-10 DIAGNOSIS — E78.2 MIXED HYPERLIPIDEMIA: ICD-10-CM

## 2025-03-10 PROCEDURE — 99213 OFFICE O/P EST LOW 20 MIN: CPT | Performed by: NURSE PRACTITIONER

## 2025-03-10 PROCEDURE — 3075F SYST BP GE 130 - 139MM HG: CPT | Performed by: NURSE PRACTITIONER

## 2025-03-10 PROCEDURE — 3008F BODY MASS INDEX DOCD: CPT | Performed by: NURSE PRACTITIONER

## 2025-03-10 PROCEDURE — 3079F DIAST BP 80-89 MM HG: CPT | Performed by: NURSE PRACTITIONER

## 2025-03-10 RX ORDER — BUPROPION HYDROCHLORIDE 150 MG/1
150 TABLET ORAL EVERY MORNING
Qty: 30 TABLET | Refills: 3 | Status: SHIPPED | OUTPATIENT
Start: 2025-03-10

## 2025-03-10 RX ORDER — NALTREXONE HYDROCHLORIDE 50 MG/1
50 TABLET, FILM COATED ORAL DAILY
Qty: 30 TABLET | Refills: 3 | Status: SHIPPED | OUTPATIENT
Start: 2025-03-10

## 2025-03-10 NOTE — PATIENT INSTRUCTIONS
Continue making lifestyle changes that focus on good nutrition, regular exercise and stress management.    Medication Plan: Start generic alternative to Contrave (www.contrave.com) with Naltrexone and Bupropion XL. Contrave is an AOM that has been on the market for 10 years. Naltrexone and Bupropion XL help to reduce hunger and cravings by action in the brain. The most common side effects can include nausea, dry mouth and constipation. Please do not take Naltrexone with any opioid based pain medication as the effect of the pain medication will likely be blocked by Naltrexone. It is safe to take Naltrexone with an over the counter pain medication, if needed, as this will not be affected. Start Naltrexone at 1/2 tab daily for 14 days and then increase to a full tab as prescribed.    Next steps to work on before next office visit include: Schedule sleep study and consult with one of our dieticians, Rhianna or Derick.    Start and/or continue tracking nutrition to remain accountable for both quality and quantity of food. Recommend setting weekly weight loss goal to 1.5-2# and caution adding your exercise, as the gisell may overestimate your daily calories. Use a food scale, measuring cups and spoons. Purchase serving dishes that are 1/4 cup, 1/2 cup and 1 cup servings. Use an gisell such as Munchkin Fun (www.mynetdiary.com), [employer], or LoseIT! to provide accountably, structure and support. Consider a consult or follow up with one of our dieticians to help modify and/or support your nutrition plan for weight loss as well as maintenance.    I recommend self monitoring to support behavior change and to learn how your environment individually impacts YOUR body. This will help promote intuitive eating practices. Goal is to track nutrition 2x/week via paper log or using an gisell such as [employer]unchkin Fun (www.mynetdiary.com) or LoseIT!. I also advise checking and logging a weekly home scale weight. Support via our counseling and dietician  teams are available with a referral. Please let me know if this individualized care is desired.      Self-Monitoring - The Way to Successful Weight Management    By Niurka York RD, LDN, Isidra Alfonso RD, CASSANDRA, Angel Villavicencio PA-C, and Rika Anders MD    OAC www.obesityaction.org Winter 2008 Resource    One major and possibly most important behavioral interventional strategy for weight management and lifestyle change is self-monitoring. Behavioral interventions are a central aspect in treatments to promote lifestyle changes that lead to weight-loss, prevent weight gain or weight regain and improve physical fitness. In the past, self-monitoring has unfortunately been one of the least popular techniques for those in weight management programs, and in some cases it is even thought of as a punishment. Because self-monitoring is critical for success with lifestyle changes, it is important to look at the various self monitoring techniques.    What is Self-monitoring?  Self-monitoring refers to the observing and recording of eating and exercise patterns, followed by feedback on the behaviors. The goal of self-monitoring is to increase self-awareness of target behaviors and outcomes, thus it can serve as an early warning system if problems are arising and can help track success. Some commonly used self-monitoring techniques include:    Food diaries  Regular self-weighing  Exercise logs  Equipment such as pedometers, accelerometers and metabolic devices  Food Logs and Diaries  One of the most common and important types of self-monitoring strategies in weight management programs is keeping a food log, in which individuals record foods, exercises or beverages as soon as they are consumed.    One important technique with food logs is individuals recording what they eat or drink as it is consumed; otherwise it may not give an accurate account of the day’s intake. A good “rule of thumb” for food logs is: “if you bite it, you  write it!”    The minimum information for weight-loss that should be kept in food logs is type, amount and caloric content of food or beverage consumed. This provides the ability to track and balance the number of calories consumed throughout the day with the amount of calories expended throughout the day.    Other nutritional information that can be logged includes: time of day of eating, fat content and carbohydrate grams. Disease-specific food logs can also be kept. For example: focusing on carbohydrate content instead of calories for patients with diabetes or insulin resistance.    Food Diaries  Another helpful tool in self-monitoring is keeping a food diary. Food diaries differ from food logs because they include more detailed information. They are useful if you are trying to find behavioral reasons or psychological aspects for eating.    Depending on the person and behavioral complexities involved, some food diaries could include the stress level, mood or feelings surrounding eating, activity or location or other environmental or emotional triggers for eating. The more complex or detailed, the better the feedback.    However, in today’s society it is almost impossible for most people to keep highly detailed daily food records over the long-term, therefore, compliance is often very low with detailed food diaries. By suggesting that patients keep a detailed food record for a few days each week, perhaps major areas of focus for nutritional and behavioral intervention can be recognized.    Logging Your Food Online  Online food logs and diaries or computer software are quick and convenient ways to keep records of foods consumed in our technologically advanced world. Many Web sites are available for tracking of foods and calories throughout the day, some of which are free and very easy to use.    You can look up food choices and/or alternative choices in online databases of more than 50,000 foods. Internet-savvy  loggers may choose to keep their journals online. Others may just choose to use these databases as a more convenient way of looking up nutritional value of foods. Some free online diaries include:    Free Web sites for searching nutritional information are available, an example is www.Nasseo. These Web sites may also offer exercise tracking and ideas, support, motivational tips and chat or discussion rooms.    Hand-held Calorie Counters  Another option for those who are “on the go” are handheld devices for calorie counting. Some of the devices are stand-alone such as CalorieSmart® or HealthFitCounter®. Others need to connect to Web sites. Other devices are installed in your Palm or Pocket-PC such as Diet Diary by [employer]. They let you download updates when nutrition facts change, however, some of them use a lot of memory.    Regular Weighing  Weighing yourself is an important and simple self-monitoring behavior to serve as reminder of one’s eating and physical activity habits. Although it may be hard and sometimes discouraging to weigh yourself while losing weight, it is recommended to weigh yourself weekly, preferably outside of the home on the same scale.    Using the scale at the local gym or exercise facility or your doctor’s office may be more accurate than home scales. However, if this is unrealistic, it is okay to use a home scale. Try to weigh yourself at the same time of day and the same day of the week.    Writing down your weekly weights on a table, graph or calendar can help you keep track of your success or to help you get back on track more quickly. It is important to note that weighing yourself more frequently than weekly is not recommended, as day to day fluctuations are not indicators of actual weight. Regular monitoring of your weight is also essential to help you maintain your weight after losing weight.    Exercise Logs  Another self-monitoring technique, along the same lines as  food logs and diaries, is keeping an exercise log or diary. The number of minutes engaged and type and level of exertion of physical activity should ideally be recorded.    An important and often forgotten aspect of exercise logs is the level of perceived exertion. Walking for 30 minutes, at an easy compared to a hard pace, will result in different levels of calories burned and cardiovascular impact.    Typically, an easy physical activity that does not increase heart rate much, or alter breathing would usually be the pace that you walk around work or go shopping. Moderate level of physical exertion is when you are getting a mildly increased heart and breathing rate. Heavy or hard level of physical exertion would be sweating, increased heart rate (target heart rate range) as well as increased breathing.    Remember, physical activity can be done at one time or intermittently throughout the day. Logging exercise can be a positive feedback or a reminder to incorporate more exercise or physical activity into your daily routine.    Initial activities may be walking, riding a stationary bike or swimming at a slow pace. Other types of exercise that can be fun are dancing, exercise videos or chair exercises. You should try to aim for 30 minutes of exercise on most days of the week.    Many people try to start out with exercise on three or four days of the week. However, if you can get yourself exercising most to all days of the week, even if only for 10 or 15 minutes, it will become more of a routine for you.    Healthy Lifestyle Tip  All adults should set a long-term goal to accumulate at least 30 minutes or more of moderate-intensity physical activity on most to all days of the week. Also, try to increase activities of daily living such as taking the stairs instead of the elevator, parking further away or walking to a bathroom that is further from your desk. Reducing sedentary time is a good strategy to increase activity  by undertaking frequent, less strenuous activities. With time, you may be able to engage in more strenuous activities.    Pedometers  Self-monitoring tools are becoming more and more popular and accurate. One of the simplest of these self-monitoring tools is a pedometer. Pedometers give objective data of physical activity throughout the day. Pedometers can be found in almost any consumer catalog or retail store.    Some of the more popular manufactures include Digi-Walker, Hello Inc, Infinity Wireless Ltd, MediaSilo, Zee Learn, TeamSupport, Freestyle, POPSUGAR, [employer] and many others. [employer] and [employer] make pedometer of speedometer devices that calculate steps and speed using GPS. These clip-on devices are inexpensive, ranging from less than $15 up to $75.    Many people get an average of 3,000 steps per day with daily activity. In order to burn off extra calories for weight-loss, walking 10,000 steps per day is recommended. For regular health, a minimum of 6,000 steps per day is required. Research suggests that a deliberate walk of 4,000-6,000 steps will help with weight-loss. It is often a good idea to keep track of your daily steps taken in your exercise log.    Pedometers can be frustrating for those who are more interested in distance traveled. Focusing on the number of steps and ways to incorporate more steps throughout the day will make as much of a difference with weight-loss as actual distance does. Pedometers encourage people to find ways to add more steps throughout the day.    Because step counting is becoming more popular, advances are being made in the technology behind pedometers. New pedometers display steps and count them accurately. They are meant to be worn everyday and all day, as motivation to keep stepping, Most are small and comfortable to wear.    Pedometers sense your body motion, counting your footsteps usually by a turned pendulum technology, a coiled spring mechanism and a hairspring mechanism  (which is the least accurate). The unit should be accurate in its count when you wear it correctly. You may need to experiment with where to wear it. You can measure your stride and then the pedometer can estimate distance traveled.    Some pedometers today offer multifunction options like calorie estimates, clocks, timers, stopwatches, speed estimators, seven-day memory or pulse rate readers, voice feedback and radios.    Accelerometers  Although pedometers are very cost-effective, one of the main flaws in using pedometers, however, is that they do not record intensity (how hard) or duration (how long) or frequency (how often) movement occurs. Accelerometers are devices that can objectively measure frequency, duration and intensity of physical activity.    Accelerometers provide a high level of accuracy when assessing physical activity. There are a variety of commercially available accelerometers, or activity monitors, which come in a wide range of prices anywhere from $50 to $1,000. BioTrainer and Nike are examples of affordable accelerometers.    Many of the more expensive accelerometers are used only in research or as a part of a hospital-based program. These monitors are more complex than pedometers in that they display and store more complex data. Some are designed to download to a computer for analysis of intensity levels, movements and physical activity patterns. They can also be used to estimate calories burned or energy expenditure.    Accelerometers have sophisticated sensors that convert physical movement into an electrical signal that is relative to the muscular force needed to produce the work. Accelerometers can be found in uniaxial or triaxial measures. Uniaxial accelerometers measure in a single plane and can be attached to the trunk or limbs. Triaxial accelerometers measure along three planes: vertical, medial-lateral and anterior-posterior.    Although accelerometers are a step up from pedometers  in accuracy of physical activity, they cannot register resistance. Therefore, if you are strength training or adding resistance to your bike or treadmill or adding an incline to your walking, it will not be able to discern the added level of energy required to do that work.    Metabolic Devices  One of the most accurate and most expensive tools for self-monitoring are tools that have very sophisticated monitoring and interpreting sensors for calories burned. Many of these devices have options to subscribe to a Web-based calorie counter system that integrates the amount of calories burned measured by the equipment and your calories consumed that you enter in easy to use food logs.    These devices are more accurate in measurements of calories expended because they use not only accelerometer technology, but also heat flux sensors, galvanic skin response (to measure physical exertion and emotional stimuli) and skin temperature gauges. Some also include heart rate monitoring techniques. All of these technologies combined lead to a very accurate measurement of calories expended throughout the day.    These devices can determine if you are sitting, sleeping, jogging, walking, lifting weights or riding in a car. Many of these devices are very expensive and used primarily for research, however, some are available commercially.    This technology is also employed by hospital-based programs. Patients wear the hospital’s armband and track their nutrition on the Web site or computer-based program for typically one to two weeks. When they return to the clinic, the information will be uploaded and the practitioners will be able to work with the patients with objective data on metabolic lifestyle patterns.    Practitioners can also monitor patients on integrated software applications to provide consultations without being face to face. Practitioners have the ability to set daily goals to tailor programs to the individual patient.  These are great tools to help objectively monitor behavior and physical activity, as well as providing real time feed back to the patient. [employer] is one company that offers this technology.    Conclusion  Although specific diseases and treatments vary, behavior modification is the major key in weight-loss or prevention and decreases the risk of diseases. Self-monitoring is a key to behavior modifications, and there are a multitude of ways to self-monitor. With technology advancements, self-monitoring techniques are changing and improving to help defeat some of the major barriers to compliance. The bottom line is that no matter how you do it, self-monitoring should be an important part of your weight-loss, weight maintenance or healthy lifestyle change. Then, the next step is to be sure the self-monitoring translates into positive behavior changes with regards to diet and exercise.

## 2025-03-14 ENCOUNTER — PATIENT MESSAGE (OUTPATIENT)
Dept: INTERNAL MEDICINE CLINIC | Facility: CLINIC | Age: 45
End: 2025-03-14

## 2025-03-14 DIAGNOSIS — E66.01 CLASS 3 SEVERE OBESITY WITH SERIOUS COMORBIDITY AND BODY MASS INDEX (BMI) OF 40.0 TO 44.9 IN ADULT, UNSPECIFIED OBESITY TYPE (HCC): ICD-10-CM

## 2025-03-14 DIAGNOSIS — Z51.81 ENCOUNTER FOR THERAPEUTIC DRUG MONITORING: Primary | ICD-10-CM

## 2025-03-14 DIAGNOSIS — E66.813 CLASS 3 SEVERE OBESITY WITH SERIOUS COMORBIDITY AND BODY MASS INDEX (BMI) OF 40.0 TO 44.9 IN ADULT, UNSPECIFIED OBESITY TYPE (HCC): ICD-10-CM

## 2025-03-24 RX ORDER — PHENTERMINE HYDROCHLORIDE 15 MG/1
15 CAPSULE ORAL EVERY MORNING
Qty: 30 CAPSULE | Refills: 3 | Status: SHIPPED | OUTPATIENT
Start: 2025-03-24

## 2025-05-01 ENCOUNTER — OFFICE VISIT (OUTPATIENT)
Dept: SLEEP CENTER | Age: 45
End: 2025-05-01
Attending: NURSE PRACTITIONER
Payer: COMMERCIAL

## 2025-05-01 DIAGNOSIS — R73.03 PREDIABETES: ICD-10-CM

## 2025-05-01 DIAGNOSIS — E66.813 CLASS 3 SEVERE OBESITY WITH SERIOUS COMORBIDITY AND BODY MASS INDEX (BMI) OF 40.0 TO 44.9 IN ADULT, UNSPECIFIED OBESITY TYPE: ICD-10-CM

## 2025-05-01 DIAGNOSIS — Z51.81 ENCOUNTER FOR THERAPEUTIC DRUG MONITORING: Primary | ICD-10-CM

## 2025-05-01 DIAGNOSIS — E78.2 MIXED HYPERLIPIDEMIA: ICD-10-CM

## 2025-05-01 DIAGNOSIS — R06.83 SNORING: ICD-10-CM

## 2025-05-01 PROCEDURE — 95810 POLYSOM 6/> YRS 4/> PARAM: CPT

## 2025-05-05 ENCOUNTER — SLEEP STUDY (OUTPATIENT)
Facility: CLINIC | Age: 45
End: 2025-05-05
Payer: COMMERCIAL

## 2025-05-05 DIAGNOSIS — G47.30 SLEEP APNEA, UNSPECIFIED TYPE: Primary | ICD-10-CM

## 2025-05-05 PROCEDURE — 95810 POLYSOM 6/> YRS 4/> PARAM: CPT | Performed by: INTERNAL MEDICINE

## 2025-05-28 ENCOUNTER — PATIENT MESSAGE (OUTPATIENT)
Dept: INTERNAL MEDICINE CLINIC | Facility: CLINIC | Age: 45
End: 2025-05-28

## 2025-05-28 NOTE — TELEPHONE ENCOUNTER
KW-Fyi  - patient completed sleep study 5/1/25     Respiratory Analysis: The Apnea-Hypopnea Index (AHI) was 10.8 events per hour. REM related AHI was 45.5 events per hour. Supine related AHI was 10.9. Lateral related AHI was 6.7. The Central Apnea Index was 0. The oxygen saturation damian was 83.6% and patient spent 0.4% with saturations less than 88%.     Future Appointments   Date Time Provider Department Center   8/4/2025  1:30 PM Olivia Ibarra,  [facility] [place]   8/5/2025  2:20 PM Dulce Thomas APRN [facility]

## 2025-05-31 NOTE — TELEPHONE ENCOUNTER
Noted, patient to maintain sleep center f/u as advised. Overall reading appears to show mild ALEXIS. Unlikely to support coverage for Zepbound. Can review at her next follow up. Continue with recommendations and treatment from [facility] (review AVS).

## 2025-07-16 ENCOUNTER — TELEMEDICINE (OUTPATIENT)
Facility: CLINIC | Age: 45
End: 2025-07-16
Payer: COMMERCIAL

## 2025-07-16 DIAGNOSIS — E66.09 OBESITY DUE TO EXCESS CALORIES WITHOUT SERIOUS COMORBIDITY, UNSPECIFIED CLASS: ICD-10-CM

## 2025-07-16 DIAGNOSIS — G43.009 MIGRAINE WITHOUT AURA AND WITHOUT STATUS MIGRAINOSUS, NOT INTRACTABLE: ICD-10-CM

## 2025-07-16 DIAGNOSIS — G47.33 OBSTRUCTIVE SLEEP APNEA: Primary | ICD-10-CM

## 2025-07-16 DIAGNOSIS — J01.80 OTHER ACUTE SINUSITIS, RECURRENCE NOT SPECIFIED: ICD-10-CM

## 2025-07-16 DIAGNOSIS — E55.9 VITAMIN D DEFICIENCY: ICD-10-CM

## 2025-07-16 DIAGNOSIS — K21.9 GASTROESOPHAGEAL REFLUX DISEASE WITHOUT ESOPHAGITIS: ICD-10-CM

## 2025-07-16 DIAGNOSIS — R73.03 PREDIABETES: ICD-10-CM

## 2025-07-16 PROCEDURE — 98002 SYNCH AUDIO-VIDEO NEW MOD 45: CPT | Performed by: OTHER

## 2025-07-16 NOTE — PROGRESS NOTES
[facility] General Pulmonary Progress Note    History of Present Illness:  Eleno Gan is a 45 year old female with snoring and restless sleep, tired all day, feels unrefreshed    Sleep schedule  10-11  SL rapid  Wakes 1-4 varies  Uses RR  -715  Does not nap    Has allergies, nasal congestion  Mouth is dry in am  Daily headaches in am  No caffeine during the day  No leg cramps or RLS  Dreams are regular  Has had sleep paralysis,  Witnessed apnea    Has lost 14 pounds with weight loss clinic  Cardio and weights      Sleep History: ELENO GAN is a 45 year old Female referred by DARRYL RESTREPO for the evaluation of suspected sleep disordered breathing.   Past Medical History is pertinent for Prediabetes, snoring, Hypersomnia.   At the time of the study, the patient was prescribed the following medications: naltrexone, buPROPion ER, ergocalciferol.   The patient was studied from 09:45:39 PM to 05:28:29 AM, with a total recording time of 462.8, total sleep time of 355.5 and a sleep efficiency of 76.8%. Wake after sleep onset was 74.5 minutes. The percentage of total sleep time by sleep stage is as follows: Stage 1 9.0%, Stage 2 48.0%, Stage 3 24.5% and Stage REM 18.6%.   Respiratory Analysis: The Apnea-Hypopnea Index (AHI) was 10.8 events per hour. REM related AHI was 45.5 events per hour. Supine related AHI was 10.9. Lateral related AHI was 6.7. The Central Apnea Index was 0. The oxygen saturation damian was 83.6% and patient spent 0.4% with saturations less than 88%.   Snoring Profile: Snoring was moderate.   Cardiac Profile: Electrocardiogram demonstrated normal sinus rhythm.   EEG Profile: Based on the limited recording montage, there were no significant EEG abnormalities noted. There were 94 arousals, with a total arousal index of 15.9.   Periodic Limb Movements: PLM index of 0. PLM arousal index of 0.   IMPRESSIONS: This study demonstrates mild obstructive sleep apnea.     Past Medical History:   Past Medical  History[1]     Past Surgical History: Past Surgical History[2]      Family Medical History: Family History[3]     Social History:   Social History     Socioeconomic History   • Marital status: Single     Spouse name: Not on file   • Number of children: Not on file   • Years of education: Not on file   • Highest education level: Not on file   Occupational History   • Not on file   Tobacco Use   • Smoking status: Former     Current packs/day: 0.00     Types: Cigarettes     Quit date: 8/14/2021     Years since quitting: 3.9   • Smokeless tobacco: Never   Vaping Use   • Vaping status: Every Day   • Substances: Nicotine   • Devices: Disposable   Substance and Sexual Activity   • Alcohol use: Yes     Comment: occasionally   • Drug use: Never   • Sexual activity: Not on file   Other Topics Concern   •  Service Not Asked   • Blood Transfusions Not Asked   • Caffeine Concern No   • Occupational Exposure Not Asked   • Hobby Hazards Not Asked   • Sleep Concern Not Asked   • Stress Concern Yes   • Weight Concern Yes   • Special Diet No   • Back Care Not Asked   • Exercise Yes   • Bike Helmet Not Asked   • Seat Belt Yes   • Self-Exams Not Asked   Social History Narrative   • Not on file     Social Drivers of Health     Food Insecurity: Not on file   Transportation Needs: Not on file   Stress: Not on file   Housing Stability: Not on file        Medications: Current Medications[4]    Review of Systems: Review of Systems     Physical Exam:  There were no vitals taken for this visit.       Constitutional: alert, cooperative. No acute distress.  HEENT: Head NC/AT. Mask in place    Results:  Personally reviewed      Assessment/Plan:  1. Obstructive sleep apnea  The pathophysiology and mechanisms of obstructive sleep apnea were explained.  Adverse health consequences seen in association with ALEXIS include increased risk of cardiovascular events , cerebrovascular events, hypertension,  diabetes. Treatment options were discussed.   Positive airway pressure therapy is the gold standard.  Other options include mandibular advancement devices, evaluation of the upper airway by ear nose throat specialist, inspire therapy, and weight loss to be used in conjunction.   Cpap trial   31-90 days  2. Obesity due to excess calories without serious comorbidity, unspecified class  Has lost 14 pounds  3. Gastroesophageal reflux disease without esophagitis    4. Migraine without aura and without status migrainosus, not intractable  stable  5. Prediabetes    6. Other acute sinusitis, recurrence not specified  Takes allergy medications  7. Vitamin D deficiency  supplemented      Olivia Ibarra DO  7/16/2025    This visit is conducted using Telemedicine with live, interactive video and audio.    Patient has been referred to the [facility] website at [email or web address] to review the yearly Consent to Treat document.    Patient understands and accepts financial responsibility for any deductible, co-insurance and/or co-pays associated with this service.           [1]  Past Medical History:  • Allergic rhinitis   • Anxiety   • Depression   • Obesity   • Pneumonia due to COVID-19 virus   • Tobacco user    Quit 8/2021   • Vitamin D deficiency   [2]  Past Surgical History:  Procedure Laterality Date   • Adenoidectomy  2013   • Hysterectomy  12/23/2015    Partial hysterectomy-still has ovaries   • Tonsillectomy  2013   [3]  Family History  Problem Relation Age of Onset   • Anemia Mother    • Hypertension Father    • High Cholesterol Father    • Dementia Maternal Grandmother    • Stroke Maternal Grandmother    • Breast Cancer Paternal Grandmother    • High Cholesterol Paternal Grandmother    • Hypertension Paternal Grandmother    • Cancer Paternal Grandmother    • Hypertension Paternal Grandfather    • High Cholesterol Paternal Grandfather    • Glaucoma Paternal Grandfather    • Cancer Paternal Grandfather    • Stroke Paternal Grandfather    • Asthma Sister     [4]  Current Outpatient Medications   Medication Sig Dispense Refill   • Phentermine HCl 15 MG Oral Cap Take 1 capsule (15 mg total) by mouth every morning. 30 capsule 3

## 2025-08-05 ENCOUNTER — TELEMEDICINE (OUTPATIENT)
Dept: INTERNAL MEDICINE CLINIC | Facility: CLINIC | Age: 45
End: 2025-08-05

## 2025-08-05 DIAGNOSIS — R73.03 PREDIABETES: ICD-10-CM

## 2025-08-05 DIAGNOSIS — E78.2 MIXED HYPERLIPIDEMIA: ICD-10-CM

## 2025-08-05 DIAGNOSIS — G47.33 OSA (OBSTRUCTIVE SLEEP APNEA): ICD-10-CM

## 2025-08-05 DIAGNOSIS — Z51.81 ENCOUNTER FOR THERAPEUTIC DRUG MONITORING: Primary | ICD-10-CM

## 2025-08-05 DIAGNOSIS — E66.813 CLASS 3 SEVERE OBESITY WITH SERIOUS COMORBIDITY AND BODY MASS INDEX (BMI) OF 40.0 TO 44.9 IN ADULT, UNSPECIFIED OBESITY TYPE: ICD-10-CM

## 2025-08-05 PROBLEM — E66.9 OBESITY, UNSPECIFIED: Status: ACTIVE | Noted: 2025-07-16

## 2025-08-05 PROCEDURE — 98006 SYNCH AUDIO-VIDEO EST MOD 30: CPT | Performed by: NURSE PRACTITIONER

## 2025-08-05 RX ORDER — PHENTERMINE HYDROCHLORIDE 37.5 MG/1
37.5 TABLET ORAL EVERY MORNING
Qty: 30 TABLET | Refills: 2 | Status: SHIPPED | OUTPATIENT
Start: 2025-08-05

## (undated) NOTE — Clinical Note
Thank you for referring Feliz Givens to the [facility]. I met with her in consultation today via person. I have ordered labs, referred for a nutrition consultation with our dietician, and ordered home sleep study. She was started on [facility] for medication therapy and will follow-up with me in about 6 weeks.